# Patient Record
Sex: FEMALE | Race: WHITE | NOT HISPANIC OR LATINO | Employment: OTHER | URBAN - METROPOLITAN AREA
[De-identification: names, ages, dates, MRNs, and addresses within clinical notes are randomized per-mention and may not be internally consistent; named-entity substitution may affect disease eponyms.]

---

## 2017-03-20 ENCOUNTER — TELEPHONE (OUTPATIENT)
Dept: OBSTETRICS AND GYNECOLOGY | Age: 67
End: 2017-03-20

## 2017-05-22 ENCOUNTER — OFFICE VISIT (OUTPATIENT)
Dept: CARDIOLOGY | Facility: CLINIC | Age: 67
End: 2017-05-22

## 2017-05-22 VITALS
WEIGHT: 210.8 LBS | HEIGHT: 66 IN | SYSTOLIC BLOOD PRESSURE: 170 MMHG | DIASTOLIC BLOOD PRESSURE: 98 MMHG | HEART RATE: 80 BPM | BODY MASS INDEX: 33.88 KG/M2

## 2017-05-22 DIAGNOSIS — I10 ESSENTIAL HYPERTENSION: ICD-10-CM

## 2017-05-22 DIAGNOSIS — E66.09 NON MORBID OBESITY DUE TO EXCESS CALORIES: Primary | ICD-10-CM

## 2017-05-22 PROCEDURE — 93000 ELECTROCARDIOGRAM COMPLETE: CPT | Performed by: INTERNAL MEDICINE

## 2017-05-22 PROCEDURE — 99204 OFFICE O/P NEW MOD 45 MIN: CPT | Performed by: INTERNAL MEDICINE

## 2017-05-22 RX ORDER — CHLORTHALIDONE 25 MG/1
25 TABLET ORAL DAILY
Qty: 90 TABLET | Refills: 3 | Status: SHIPPED | OUTPATIENT
Start: 2017-05-22 | End: 2018-08-27

## 2017-06-06 ENCOUNTER — TELEPHONE (OUTPATIENT)
Dept: CARDIOLOGY | Facility: CLINIC | Age: 67
End: 2017-06-06

## 2017-06-06 ENCOUNTER — LAB (OUTPATIENT)
Dept: LAB | Facility: HOSPITAL | Age: 67
End: 2017-06-06

## 2017-06-06 ENCOUNTER — OFFICE VISIT (OUTPATIENT)
Dept: CARDIOLOGY | Facility: CLINIC | Age: 67
End: 2017-06-06

## 2017-06-06 VITALS
BODY MASS INDEX: 34.07 KG/M2 | DIASTOLIC BLOOD PRESSURE: 88 MMHG | SYSTOLIC BLOOD PRESSURE: 132 MMHG | HEIGHT: 66 IN | HEART RATE: 87 BPM | WEIGHT: 212 LBS | OXYGEN SATURATION: 100 %

## 2017-06-06 DIAGNOSIS — I10 ESSENTIAL HYPERTENSION: Primary | ICD-10-CM

## 2017-06-06 DIAGNOSIS — I10 ESSENTIAL HYPERTENSION: ICD-10-CM

## 2017-06-06 LAB
ANION GAP SERPL CALCULATED.3IONS-SCNC: 12.5 MMOL/L
BUN BLD-MCNC: 18 MG/DL (ref 8–23)
BUN/CREAT SERPL: 22.2 (ref 7–25)
CALCIUM SPEC-SCNC: 9.8 MG/DL (ref 8.6–10.5)
CHLORIDE SERPL-SCNC: 101 MMOL/L (ref 98–107)
CO2 SERPL-SCNC: 27.5 MMOL/L (ref 22–29)
CREAT BLD-MCNC: 0.81 MG/DL (ref 0.57–1)
GFR SERPL CREATININE-BSD FRML MDRD: 71 ML/MIN/1.73
GLUCOSE BLD-MCNC: 111 MG/DL (ref 65–99)
POTASSIUM BLD-SCNC: 3.5 MMOL/L (ref 3.5–5.2)
SODIUM BLD-SCNC: 141 MMOL/L (ref 136–145)

## 2017-06-06 PROCEDURE — 99213 OFFICE O/P EST LOW 20 MIN: CPT | Performed by: PHYSICIAN ASSISTANT

## 2017-06-06 PROCEDURE — 93000 ELECTROCARDIOGRAM COMPLETE: CPT | Performed by: PHYSICIAN ASSISTANT

## 2017-06-06 PROCEDURE — 80048 BASIC METABOLIC PNL TOTAL CA: CPT

## 2017-06-06 PROCEDURE — 36415 COLL VENOUS BLD VENIPUNCTURE: CPT

## 2017-06-06 NOTE — PROGRESS NOTES
Date of Office Visit: 2017  Encounter Provider: RAIN Ha  Place of Service: [facility]  Patient Name: Neha Parikh  :1950    Chief Complaint   Patient presents with   • Hypertension     2 week hospital follow up   :     HPI: Neha Parikh is a 67 y.o. female, new to me, who presents today for follow-up.  Old records have been obtained and reviewed by me.  She is a patient of Dr. Mora's with a past medical history significant for hypertension, questionable TIA, and intolerance to many medications.  In the fall of  she was in [facility] with TIA symptoms where she lost the ability to speak for about a minute.  She had a 48 hour Holter monitor which was negative.  Her blood pressure was high that day.  She had an echocardiogram which was unremarkable.  She was last in our office to see Dr. Mora on 2017.  At that visit, she was on carvedilol 6.25 mg daily and losartan 50 mg twice a day.  She felt that the medicines were reducing her sex drive, and Dr. Mora discontinued the Coreg.  He started her on chlorthalidone 25 mg daily.  She is here today for a blood pressure check as well as a BMP.  Dr. Mora felt that if her blood pressure was not controlled well today, we would retry the amlodipine and/or may be hydralazine.  He did not feel that hydralazine would be good for her because it is 3 times a day and she does not like taking medications.   Over the past 2 weeks she's been doing well.  She feels like her energy has come back since she discontinued the carvedilol.  She still has some swelling in her legs, however this is normal for her.  I really cannot appreciate any swelling in her legs today.  She does complain of fatigue and some aches and pains.  She really is pretty inactive, and does not get much exercise at all.  She denies any chest pain, shortness of breath, palpitations, dizziness, or syncope.  She does not  know what her blood pressure has been doing at home because she does not check it.  She states that she use to check her blood pressure all the time, and she would get pretty worked up about it and it would be elevated because of her anxiety.    Past Medical History:   Diagnosis Date   • Anxiety    • Cervical polyp    • Exercise-induced shortness of breath    • GERD (gastroesophageal reflux disease)    • H/O mammogram    • Hypertension    • Nipple retraction    • Palpitations    • Pap smear for cervical cancer screening    • Sinusitis    • Sleep apnea    • Snoring    •  (spontaneous vaginal delivery)    • TIA (transient ischemic attack)     ? MOMENT OF SLURRED SPEECH   • Varicella    • Weight gain        Past Surgical History:   Procedure Laterality Date   • APPENDECTOMY     • DILATATION AND CURETTAGE N/A 2016    Procedure: CERVICAL POLYPECTOMY;  Surgeon: Mackenzie Lassiter MD;  Location: [facility];  Service:        Social History     Social History   • Marital status:      Spouse name: N/A   • Number of children: N/A   • Years of education: N/A     Occupational History   • Not on file.     Social History Main Topics   • Smoking status: Never Smoker   • Smokeless tobacco: Never Used   • Alcohol use No   • Drug use: No   • Sexual activity: Defer     Other Topics Concern   • Not on file     Social History Narrative       Family History   Problem Relation Age of Onset   • Breast cancer Maternal Aunt    • Coronary artery disease Father    • Diabetes Father    • Heart disease Father    • Diabetes Sister    • Stroke Paternal Grandfather    • Alzheimer's disease Mother    • No Known Problems Maternal Grandmother    • No Known Problems Maternal Grandfather    • No Known Problems Paternal Grandmother        Review of Systems   Constitution: Positive for malaise/fatigue. Negative for chills, fever, weight gain and weight loss.   HENT: Negative for ear pain, headaches, hearing loss, nosebleeds and sore  "throat.    Eyes: Negative for double vision, pain and visual disturbance.   Cardiovascular: Positive for leg swelling. Negative for chest pain, dyspnea on exertion, irregular heartbeat, near-syncope, orthopnea, palpitations, paroxysmal nocturnal dyspnea and syncope.   Respiratory: Negative for cough, shortness of breath, sleep disturbances due to breathing, snoring and wheezing.    Endocrine: Negative for cold intolerance, heat intolerance and polyuria.   Skin: Negative for itching and rash.   Musculoskeletal: Negative for joint pain, joint swelling and myalgias.   Gastrointestinal: Negative for abdominal pain, diarrhea, melena, nausea and vomiting.   Genitourinary: Negative for frequency, hematuria and hesitancy.   Neurological: Negative for excessive daytime sleepiness, light-headedness, numbness, paresthesias and seizures.   Psychiatric/Behavioral: Negative for altered mental status and depression.   Allergic/Immunologic: Negative.    All other systems reviewed and are negative.      Allergies   Allergen Reactions   • Penicillins Hives     And rash   • Cefaclor Rash and Hives         Current Outpatient Prescriptions:   •  aspirin 81 MG EC tablet, Take 81 mg by mouth Daily., Disp: , Rfl:   •  chlorthalidone (HYGROTON) 25 MG tablet, Take 1 tablet by mouth Daily., Disp: 90 tablet, Rfl: 3  •  losartan (COZAAR) 50 MG tablet, Take 50 mg by mouth 2 (Two) Times a Day., Disp: , Rfl:      Objective:     Vitals:    06/06/17 0927 06/06/17 0942   BP: 136/90 132/88   BP Location: Right arm Left arm   Pulse: 87    SpO2: 100%    Weight: 212 lb (96.2 kg)    Height: 66\" (167.6 cm)      Body mass index is 34.22 kg/(m^2).    PHYSICAL EXAM:    Physical Exam   Constitutional: She is oriented to person, place, and time. She appears well-developed and well-nourished. No distress.   HENT:   Head: Normocephalic and atraumatic.   Eyes: Pupils are equal, round, and reactive to light.   Neck: No JVD present. No thyromegaly present. "   Cardiovascular: Normal rate, regular rhythm, normal heart sounds and intact distal pulses.    No murmur heard.  Pulmonary/Chest: Effort normal and breath sounds normal. No respiratory distress.   Abdominal: Soft. Bowel sounds are normal. She exhibits no distension. There is no splenomegaly or hepatomegaly. There is no tenderness.   Musculoskeletal: Normal range of motion. She exhibits no edema.   Neurological: She is alert and oriented to person, place, and time.   Skin: Skin is warm and dry. She is not diaphoretic. No erythema.   Psychiatric: She has a normal mood and affect. Her behavior is normal. Judgment normal.         ECG 12 Lead  Date/Time: 6/6/2017 9:48 AM  Performed by: FELY RAMAN.  Authorized by: FELY RAMAN   Comparison: compared with previous ECG from 5/22/2017  Similar to previous ECG  Rhythm: sinus rhythm  BPM: 87  T flattening: V1  Q waves: III and V1  Clinical impression: abnormal ECG  Comments: Indication: Hypertension.              Assessment:       Diagnosis Plan   1. Essential hypertension  Basic Metabolic Panel    ECG 12 Lead     Orders Placed This Encounter   Procedures   • Basic Metabolic Panel     Standing Status:   Future     Standing Expiration Date:   6/6/2018   • ECG 12 Lead     This order was created via procedure documentation          Plan:       Overall she is doing well.  Her blood pressure is much better controlled today at 132/88.  I did discuss with her at length the need to watch her sodium intake.  I've also encouraged her to get some exercise.  Her  walks every day despite the fact that he has a tracheotomy.  Her neighborhood has a lot of access to walking paths, and I really think the daily exercise would be good for her blood pressure as well as her aches and pains.  She indicated that she understood.  I'm going to check a BMP today.  She will come to our outpatient lab either today or tomorrow to get her blood work checked.  I'm going to have her  follow-up with Dr. Mora in 1 year or sooner if needed.    As always, it has been a pleasure to participate in your patient's care.      Sincerely,         Galina Roque PA-C

## 2017-06-16 ENCOUNTER — TELEPHONE (OUTPATIENT)
Dept: CARDIOLOGY | Facility: CLINIC | Age: 67
End: 2017-06-16

## 2017-06-16 RX ORDER — HYDRALAZINE HYDROCHLORIDE 25 MG/1
25 TABLET, FILM COATED ORAL 3 TIMES DAILY
Qty: 90 TABLET | Refills: 11 | Status: SHIPPED | OUTPATIENT
Start: 2017-06-16 | End: 2022-01-10

## 2017-06-16 NOTE — TELEPHONE ENCOUNTER
I talked to the patient.  She is having complaints of muscle and joint aches and pains as well as itching dry skin.  She thinks that it is the losartan.  She says that she is just really super sensitive to medications.  Her blood pressure has been well-controlled, it is in the systolic 130s.  She is very frustrated, and she has seen her primary care physician and they don't really have much to offer her.  I'm going to discontinue the losartan and start her on hydralazine 25 mg 3 times a day.  She will call me next week to check an.

## 2017-06-16 NOTE — TELEPHONE ENCOUNTER
I talked to the patient and she is having joint and mucsle pain along with itching. This seams to have started since she started taking losartan. She would like to know if you or Dr Mora may know of some medications that do not have as many side effects. She can be reached at [phone]      Thanks   Edd

## 2017-11-07 ENCOUNTER — PROCEDURE VISIT (OUTPATIENT)
Dept: OBSTETRICS AND GYNECOLOGY | Age: 67
End: 2017-11-07

## 2017-11-07 ENCOUNTER — OFFICE VISIT (OUTPATIENT)
Dept: OBSTETRICS AND GYNECOLOGY | Age: 67
End: 2017-11-07

## 2017-11-07 VITALS
WEIGHT: 217 LBS | DIASTOLIC BLOOD PRESSURE: 98 MMHG | SYSTOLIC BLOOD PRESSURE: 160 MMHG | BODY MASS INDEX: 36.15 KG/M2 | HEIGHT: 65 IN

## 2017-11-07 DIAGNOSIS — L90.0 LICHEN SCLEROSUS: ICD-10-CM

## 2017-11-07 DIAGNOSIS — Z13.820 SCREENING FOR OSTEOPOROSIS: ICD-10-CM

## 2017-11-07 DIAGNOSIS — Z00.00 ENCOUNTER FOR MEDICARE ANNUAL WELLNESS EXAM: Primary | ICD-10-CM

## 2017-11-07 DIAGNOSIS — Z12.4 SCREENING FOR CERVICAL CANCER: ICD-10-CM

## 2017-11-07 DIAGNOSIS — Z78.0 POST-MENOPAUSAL: Primary | ICD-10-CM

## 2017-11-07 DIAGNOSIS — R14.0 BLOATING: ICD-10-CM

## 2017-11-07 PROCEDURE — 77080 DXA BONE DENSITY AXIAL: CPT | Performed by: OBSTETRICS & GYNECOLOGY

## 2017-11-07 PROCEDURE — G0101 CA SCREEN;PELVIC/BREAST EXAM: HCPCS | Performed by: OBSTETRICS & GYNECOLOGY

## 2017-11-07 NOTE — PROGRESS NOTES
Routine Annual Visit    2017    Patient: Neha Parikh          MR#:[id number]      Chief Complaint   Patient presents with   • Annual Exam     PT HERE FOR ROUTINE AE AND MG. SHE IS WELL, PCP IS TRYING A NEW BP MED ON HER. NO PAP ON FILE IN EPIC OR [facility] Week[facility].       History of Present Illness    67 y.o. female  who presents for annual exam.   No more bleeding since polyp removed last year  Having intermittent vulvar itching and dryness  Pap UTD  mammo today  CSC 4 years ago  Stephanie La mom  Bloating and weight gain, also knee pain, concerned about ovarian cancer  Discussed dexa scan- hasnt had one in quite sometime and opposed to meds  Discussed rationale for checking and taking fosomax if has osteoporosis          No LMP recorded. Patient is postmenopausal.  Obstetric History:  OB History      Para Term  AB Living    2 2 2 0 0 2    SAB TAB Ectopic Multiple Live Births    0 0 0 0          Menstrual History:     No LMP recorded. Patient is postmenopausal.       Sexual History:       ________________________________________  Patient Active Problem List   Diagnosis   • Cervical polyp   • Non morbid obesity due to excess calories   • Hypertension       Past Medical History:   Diagnosis Date   • Anxiety    • Cervical polyp    • Exercise-induced shortness of breath    • GERD (gastroesophageal reflux disease)    • H/O mammogram    • Hypertension    • Nipple retraction    • Palpitations    • Pap smear for cervical cancer screening    • Sinusitis    • Sleep apnea    • Snoring    •  (spontaneous vaginal delivery)    • TIA (transient ischemic attack)     ? MOMENT OF SLURRED SPEECH   • Varicella    • Weight gain        Past Surgical History:   Procedure Laterality Date   • APPENDECTOMY     • DILATATION AND CURETTAGE N/A 2016    Procedure: CERVICAL POLYPECTOMY;  Surgeon: Mackenzie Lassiter MD;  Location: [facility] OR;  Service:        History   Smoking Status   • Never Smoker   Smokeless  "Tobacco   • Never Used       has a current medication list which includes the following prescription(s): hydralazine, aspirin, betamethasone valerate, and chlorthalidone.  ________________________________________    Current contraception: post menopausal status  History of abnormal Pap smear: no  Family history of Breast cancer: no  Family history of uterine or ovarian cancer: no  Family History of colon cancer/colon polyps: no  History of abnormal mammogram: no      The following portions of the patient's history were reviewed and updated as appropriate: allergies, current medications, past family history, past medical history, past social history, past surgical history and problem list.    Review of Systems    Pertinent items are noted in HPI.     Objective   Physical Exam    /98  Ht 65\" (165.1 cm)  Wt 217 lb (98.4 kg)  BMI 36.11 kg/m2   BP Readings from Last 3 Encounters:   11/07/17 160/98   06/06/17 132/88   05/22/17 170/98      Wt Readings from Last 3 Encounters:   11/07/17 217 lb (98.4 kg)   06/06/17 212 lb (96.2 kg)   05/22/17 210 lb 12.8 oz (95.6 kg)      BMI: Estimated body mass index is 36.11 kg/(m^2) as calculated from the following:    Height as of this encounter: 65\" (165.1 cm).    Weight as of this encounter: 217 lb (98.4 kg).      General:   alert, appears stated age and cooperative   Abdomen: soft, non-tender, without masses or organomegaly   Breast: inspection negative, no nipple discharge or bleeding, no masses or nodularity palpable   Vulva: evidence of lichen sclerosus especially on right labia, no suspicious lesions   Vagina: normal mucosa   Cervix: no cervical motion tenderness and no lesions   Uterus: normal size, mobile or non-tender   Adnexa: no mass, fullness, tenderness     Assessment:    1. Normal annual exam   Assessment     ICD-10-CM ICD-9-CM   1. Encounter for Medicare annual wellness exam Z00.00 V70.0   2. Bloating R14.0 787.3   3. Lichen sclerosus L90.0 701.0   4. Screening " for osteoporosis Z13.820 V82.81   5. Screening for cervical cancer Z12.4 V76.2     Plan:    Plan     [x]  Mammogram request made  []  PAP done  []  Labs:   []  GC/Chl/TV  [x]  DEXA scan   []  Referral for colonoscopy:       Neha was seen today for annual exam.    Diagnoses and all orders for this visit:    Encounter for Medicare annual wellness exam    Bloating    Lichen sclerosus    Screening for osteoporosis    Screening for cervical cancer  -     IGP, Apt HPV,rfx 16 / 18,45 - ThinPrep Vial, Cervix    Other orders  -     betamethasone valerate (VALISONE) 0.1 % ointment; Apply  topically 2 (Two) Times a Day. Apply BID for 1 week for flare up , apply 2-3 times weekly for maintenance  -      DEXA SCAN      Check GYN US seocndary to new onset bloating symptoms  dexa today- discussed fosomax if necessary  Discussed lichen sclerosus and treatment sent in  Pap done - no abnormal, may be last screening pap if normal  m[place]o done  [place] [facility] [place]A done after exam- reviewed and will call her with results, hip- osteoporosis and severe osteopenia of spine area, will send fosomax to pharmacy    Counseling:  --Nutrition: Stressed importance of moderation and caloric balance, stressed fresh fruit and vegetables  --Exercise: Stressed the importance of regular exercise. 3-5 times weekly   - Discussed screening mammogram recommendations.   --Discussed benefits of screening colonoscopy- age 50 unless FH  --Discussed pap smear screening recommendations

## 2017-11-10 LAB
CYTOLOGIST CVX/VAG CYTO: NORMAL
CYTOLOGY CVX/VAG DOC THIN PREP: NORMAL
DX ICD CODE: NORMAL
HIV 1 & 2 AB SER-IMP: NORMAL
HPV I/H RISK 4 DNA CVX QL PROBE+SIG AMP: NEGATIVE
Lab: NORMAL
OTHER STN SPEC: NORMAL
PATH REPORT.FINAL DX SPEC: NORMAL
STAT OF ADQ CVX/VAG CYTO-IMP: NORMAL

## 2017-12-14 ENCOUNTER — PROCEDURE VISIT (OUTPATIENT)
Dept: OBSTETRICS AND GYNECOLOGY | Age: 67
End: 2017-12-14

## 2017-12-14 ENCOUNTER — OFFICE VISIT (OUTPATIENT)
Dept: OBSTETRICS AND GYNECOLOGY | Age: 67
End: 2017-12-14

## 2017-12-14 VITALS
DIASTOLIC BLOOD PRESSURE: 88 MMHG | HEIGHT: 55 IN | WEIGHT: 215 LBS | SYSTOLIC BLOOD PRESSURE: 128 MMHG | BODY MASS INDEX: 49.76 KG/M2

## 2017-12-14 DIAGNOSIS — M81.0 AGE-RELATED OSTEOPOROSIS WITHOUT CURRENT PATHOLOGICAL FRACTURE: ICD-10-CM

## 2017-12-14 DIAGNOSIS — R19.09 OTHER INTRA-ABDOMINAL AND PELVIC SWELLING, MASS AND LUMP: ICD-10-CM

## 2017-12-14 DIAGNOSIS — R14.0 BLOATING: Primary | ICD-10-CM

## 2017-12-14 PROCEDURE — 76830 TRANSVAGINAL US NON-OB: CPT | Performed by: OBSTETRICS & GYNECOLOGY

## 2017-12-14 PROCEDURE — 99213 OFFICE O/P EST LOW 20 MIN: CPT | Performed by: OBSTETRICS & GYNECOLOGY

## 2017-12-14 NOTE — PROGRESS NOTES
GYN Visit    2017    Patient: Neha Parikh          MR#:[id number]      Chief Complaint   Patient presents with   • Follow-up     PT HERE FOR GYN U/S DUE TO BLOATING. SHE IS OTHERWISE GOOD.       History of Present Illness    67 y.o. 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 Patient is postmenopausal.    ________________________________________  Patient Active Problem List   Diagnosis   • Cervical polyp   • Non morbid obesity due to excess calories   • Hypertension       Past Medical History:   Diagnosis Date   • Anxiety    • Cervical polyp    • Exercise-induced shortness of breath    • GERD (gastroesophageal reflux disease)    • H/O mammogram    • Hypertension    • Nipple retraction    • Palpitations    • Pap smear for cervical cancer screening    • Sinusitis    • Sleep apnea    • Snoring    •  (spontaneous vaginal delivery)    • TIA (transient ischemic attack)     ? MOMENT OF SLURRED SPEECH   • Varicella    • Weight gain        Past Surgical History:   Procedure Laterality Date   • APPENDECTOMY     • DILATATION AND CURETTAGE N/A 2016    Procedure: CERVICAL POLYPECTOMY;  Surgeon: Mackenzie Lassiter MD;  Location: [facility];  Service:        History   Smoking Status   • Never Smoker   Smokeless Tobacco   • Never Used       has a current medication list which includes the following prescription(s): hydralazine, aspirin, betamethasone valerate, and chlorthalidone.  ________________________________________    Current contraception: post menopausal  "status      The following portions of the patient's history were reviewed and updated as appropriate: allergies, current medications, past family history, past medical history, past social history, past surgical history and problem list.    Review of Systems   Constitutional: Negative for chills, fatigue and fever.   Gastrointestinal: Positive for abdominal distention and constipation. Negative for diarrhea, nausea and vomiting.   Genitourinary: Negative for pelvic pain and vaginal discharge.   Neurological: Negative for light-headedness.   Psychiatric/Behavioral: Negative for dysphoric mood.   All other systems reviewed and are negative.           Objective   Physical Exam    /88  Ht 65 cm (25.59\")  Wt 97.5 kg (215 lb)  .82 kg/m2   BP Readings from Last 3 Encounters:   12/14/17 128/88   11/07/17 160/98   06/06/17 132/88      Wt Readings from Last 3 Encounters:   12/14/17 97.5 kg (215 lb)   11/07/17 98.4 kg (217 lb)   06/06/17 96.2 kg (212 lb)      BMI: Estimated body mass index is 230.82 kg/(m^2) as calculated from the following:    Height as of this encounter: 65 cm (25.59\").    Weight as of this encounter: 97.5 kg (215 lb).    Neck: no adenopathy, supple, symmetrical, trachea midline and thyroid not enlarged, symmetric, no tenderness/mass/nodules  Lungs: non labored breathing  Extremities: extremities normal, atraumatic, no cyanosis or edema  Skin: Skin color, texture, turgor normal. No rashes or lesions  Neurologic: Grossly normal  General:   alert, appears stated age and cooperative   Abdomen: soft, non-tender, without masses or organomegaly   Breast: Not examined   Vulva: normal   Vagina: normal mucosa   Cervix: no cervical motion tenderness and no lesions   Uterus: normal size, mobile or non-tender   Adnexa: no mass, fullness, tenderness     Assessment:      Neha was seen today for follow-up.    Diagnoses and all orders for this visit:    Bloating  -         Age-related osteoporosis " "without current pathological fracture    Other intra-abdominal and pelvic swelling, mass and lump   -           See US report  Normal pelvic US- no mass or free fluid  rec check     Strongly encouraged treatment for osteoporosis  Pt doing wt bearing exercise and getting dietary calcium and vit D supplement  Pt reluctant to take any meds and wants to \"let the cards fall how they will\"  I gave her a pamphlet re fosomax to look at - she will call back if she changes her mind        "

## 2017-12-15 LAB — CANCER AG125 SERPL-ACNC: 11.1 U/ML (ref 0–38.1)

## 2018-05-16 VITALS
HEART RATE: 88 BPM | SYSTOLIC BLOOD PRESSURE: 132 MMHG | HEIGHT: 66 IN | DIASTOLIC BLOOD PRESSURE: 84 MMHG | RESPIRATION RATE: 16 BRPM | OXYGEN SATURATION: 100 % | OXYGEN SATURATION: 92 % | DIASTOLIC BLOOD PRESSURE: 87 MMHG | RESPIRATION RATE: 17 BRPM | DIASTOLIC BLOOD PRESSURE: 83 MMHG | SYSTOLIC BLOOD PRESSURE: 176 MMHG | HEART RATE: 77 BPM | RESPIRATION RATE: 6 BRPM | HEART RATE: 86 BPM | SYSTOLIC BLOOD PRESSURE: 122 MMHG | HEART RATE: 94 BPM | HEART RATE: 96 BPM | DIASTOLIC BLOOD PRESSURE: 92 MMHG | RESPIRATION RATE: 29 BRPM | OXYGEN SATURATION: 99 % | SYSTOLIC BLOOD PRESSURE: 173 MMHG | WEIGHT: 205 LBS | HEART RATE: 76 BPM | TEMPERATURE: 98.8 F | OXYGEN SATURATION: 86 % | TEMPERATURE: 96.5 F | SYSTOLIC BLOOD PRESSURE: 160 MMHG | SYSTOLIC BLOOD PRESSURE: 170 MMHG | DIASTOLIC BLOOD PRESSURE: 98 MMHG | RESPIRATION RATE: 23 BRPM

## 2018-05-17 ENCOUNTER — AMBULATORY SURGICAL CENTER (AMBULATORY)
Dept: URBAN - METROPOLITAN AREA SURGERY 17 | Facility: SURGERY | Age: 68
End: 2018-05-17

## 2018-05-17 ENCOUNTER — OFFICE (AMBULATORY)
Dept: URBAN - METROPOLITAN AREA PATHOLOGY 4 | Facility: PATHOLOGY | Age: 68
End: 2018-05-17

## 2018-05-17 DIAGNOSIS — Z86.010 PERSONAL HISTORY OF COLONIC POLYPS: ICD-10-CM

## 2018-05-17 DIAGNOSIS — D12.5 BENIGN NEOPLASM OF SIGMOID COLON: ICD-10-CM

## 2018-05-17 DIAGNOSIS — D12.3 BENIGN NEOPLASM OF TRANSVERSE COLON: ICD-10-CM

## 2018-05-17 DIAGNOSIS — D12.2 BENIGN NEOPLASM OF ASCENDING COLON: ICD-10-CM

## 2018-05-17 DIAGNOSIS — K21.0 GASTRO-ESOPHAGEAL REFLUX DISEASE WITH ESOPHAGITIS: ICD-10-CM

## 2018-05-17 DIAGNOSIS — K44.9 DIAPHRAGMATIC HERNIA WITHOUT OBSTRUCTION OR GANGRENE: ICD-10-CM

## 2018-05-17 DIAGNOSIS — K21.9 GASTRO-ESOPHAGEAL REFLUX DISEASE WITHOUT ESOPHAGITIS: ICD-10-CM

## 2018-05-17 LAB
GI HISTOLOGY: A. UNSPECIFIED: (no result)
GI HISTOLOGY: B. UNSPECIFIED: (no result)
GI HISTOLOGY: C. UNSPECIFIED: (no result)
GI HISTOLOGY: D. UNSPECIFIED: (no result)
GI HISTOLOGY: E. UNSPECIFIED: (no result)
GI HISTOLOGY: PDF REPORT: (no result)

## 2018-05-17 PROCEDURE — 88305 TISSUE EXAM BY PATHOLOGIST: CPT | Mod: PT | Performed by: INTERNAL MEDICINE

## 2018-05-17 PROCEDURE — 45385 COLONOSCOPY W/LESION REMOVAL: CPT | Mod: PT | Performed by: INTERNAL MEDICINE

## 2018-05-17 PROCEDURE — 43239 EGD BIOPSY SINGLE/MULTIPLE: CPT | Performed by: INTERNAL MEDICINE

## 2018-05-17 RX ADMIN — PROPOFOL 50 MG: 10 INJECTION, EMULSION INTRAVENOUS at 10:06

## 2018-05-17 RX ADMIN — PROPOFOL 50 MG: 10 INJECTION, EMULSION INTRAVENOUS at 10:25

## 2018-05-17 RX ADMIN — PROPOFOL 50 MG: 10 INJECTION, EMULSION INTRAVENOUS at 10:00

## 2018-05-17 RX ADMIN — PROPOFOL 100 MG: 10 INJECTION, EMULSION INTRAVENOUS at 09:46

## 2018-05-17 RX ADMIN — PROPOFOL 50 MG: 10 INJECTION, EMULSION INTRAVENOUS at 10:10

## 2018-05-17 RX ADMIN — PROPOFOL 100 MG: 10 INJECTION, EMULSION INTRAVENOUS at 09:47

## 2018-05-17 RX ADMIN — PROPOFOL 50 MG: 10 INJECTION, EMULSION INTRAVENOUS at 10:14

## 2018-05-17 RX ADMIN — PROPOFOL 50 MG: 10 INJECTION, EMULSION INTRAVENOUS at 09:50

## 2018-05-17 RX ADMIN — PROPOFOL 50 MG: 10 INJECTION, EMULSION INTRAVENOUS at 09:55

## 2018-08-27 ENCOUNTER — OFFICE VISIT (OUTPATIENT)
Dept: CARDIOLOGY | Facility: CLINIC | Age: 68
End: 2018-08-27

## 2018-08-27 VITALS
HEART RATE: 91 BPM | SYSTOLIC BLOOD PRESSURE: 170 MMHG | DIASTOLIC BLOOD PRESSURE: 100 MMHG | WEIGHT: 217 LBS | BODY MASS INDEX: 34.87 KG/M2 | HEIGHT: 66 IN

## 2018-08-27 DIAGNOSIS — I10 ESSENTIAL HYPERTENSION: Primary | ICD-10-CM

## 2018-08-27 PROCEDURE — 93000 ELECTROCARDIOGRAM COMPLETE: CPT | Performed by: INTERNAL MEDICINE

## 2018-08-27 PROCEDURE — 99214 OFFICE O/P EST MOD 30 MIN: CPT | Performed by: INTERNAL MEDICINE

## 2018-08-27 NOTE — PROGRESS NOTES
Date of Office Visit: 2018  Encounter Provider: Hang Mora MD  Place of Service: [facility] CARDIOLOGY  Patient Name: Neha Parikh  :1950  [id number]    Chief Complaint   Patient presents with   • Hypertension   :     HPI: Neha Parikh is a 68 y.o. female   This is a pretty tough problem because of an intolerance to a number of our medicines.  She, I think, could be significantly hypertensive, but what I have asked her to do is check her blood pressures at home once a day over the next two weeks and send them in to us so we can see what they are and if they are high at home then I would increase her hydralazine.  I am going to have her come and see RAIN Hess in three months and see me in six months.          Past Medical History:   Diagnosis Date   • Anxiety    • Cervical polyp    • Exercise-induced shortness of breath    • GERD (gastroesophageal reflux disease)    • H/O mammogram    • Hypertension    • Nipple retraction    • Palpitations    • Pap smear for cervical cancer screening    • Sinusitis    • Sleep apnea    • Snoring    •  (spontaneous vaginal delivery)    • TIA (transient ischemic attack)     ? MOMENT OF SLURRED SPEECH   • Varicella    • Weight gain        Past Surgical History:   Procedure Laterality Date   • APPENDECTOMY     • DILATATION AND CURETTAGE N/A 2016    Procedure: CERVICAL POLYPECTOMY;  Surgeon: Mackenzie Lassiter MD;  Location: [facility];  Service:        Social History     Social History   • Marital status:      Spouse name: N/A   • Number of children: N/A   • Years of education: N/A     Occupational History   • Not on file.     Social History Main Topics   • Smoking status: Never Smoker   • Smokeless tobacco: Never Used   • Alcohol use No   • Drug use: No   • Sexual activity: Defer     Other Topics Concern   • Not on file     Social History Narrative   • No narrative on file       Family History   Problem  Relation Age of Onset   • Breast cancer Maternal Aunt    • Coronary artery disease Father    • Diabetes Father    • Heart disease Father    • Diabetes Sister    • Stroke Paternal Grandfather    • Alzheimer's disease Mother    • No Known Problems Maternal Grandmother    • No Known Problems Maternal Grandfather    • No Known Problems Paternal Grandmother    • No Known Problems Brother    • No Known Problems Daughter    • No Known Problems Son    • No Known Problems Paternal Aunt    • BRCA 1/2 Neg Hx    • Colon cancer Neg Hx    • Endometrial cancer Neg Hx    • Ovarian cancer Neg Hx        Review of Systems   Constitution: Negative for decreased appetite, fever, malaise/fatigue and weight loss.   HENT: Negative for nosebleeds.    Eyes: Negative for double vision.   Cardiovascular: Negative for chest pain, claudication, cyanosis, dyspnea on exertion, irregular heartbeat, leg swelling, near-syncope, orthopnea, palpitations, paroxysmal nocturnal dyspnea and syncope.   Respiratory: Negative for cough, hemoptysis and shortness of breath.    Hematologic/Lymphatic: Negative for bleeding problem.   Skin: Negative for rash.   Musculoskeletal: Negative for falls and myalgias.   Gastrointestinal: Negative for hematochezia, jaundice, melena, nausea and vomiting.   Genitourinary: Negative for hematuria.   Neurological: Negative for dizziness and seizures.   Psychiatric/Behavioral: Negative for altered mental status and memory loss.       Allergies   Allergen Reactions   • Chlorthalidone Unknown (See Comments)     cramps   • Losartan Unknown (See Comments)     unknoiwn   • Penicillins Hives     And rash   • Cefaclor Rash and Hives         Current Outpatient Prescriptions:   •  aspirin 81 MG EC tablet, Take 81 mg by mouth Daily., Disp: , Rfl:   •  hydrALAZINE (APRESOLINE) 25 MG tablet, Take 1 tablet by mouth 3 (Three) Times a Day., Disp: 90 tablet, Rfl: 11     Objective:     Vitals:    08/27/18 1536   BP: 170/100   Pulse: 91   Weight:  "98.4 kg (217 lb)   Height: 167.6 cm (66\")     Body mass index is 35.02 kg/m².    Physical Exam   Constitutional: She is oriented to person, place, and time. She appears well-developed and well-nourished.   HENT:   Head: Normocephalic.   Eyes: No scleral icterus.   Neck: No JVD present. No thyromegaly present.   Cardiovascular: Normal rate, regular rhythm and normal heart sounds.  Exam reveals no gallop and no friction rub.    No murmur heard.  Pulmonary/Chest: Effort normal and breath sounds normal. She has no wheezes. She has no rales.   Abdominal: Soft. There is no hepatosplenomegaly. There is no tenderness.   Musculoskeletal: Normal range of motion. She exhibits no edema.   Lymphadenopathy:     She has no cervical adenopathy.   Neurological: She is alert and oriented to person, place, and time.   Skin: Skin is warm and dry. No rash noted.   Psychiatric: She has a normal mood and affect.         ECG 12 Lead  Date/Time: 8/27/2018 4:22 PM  Performed by: SUHA MORA  Authorized by: SUHA MORA   Comparison: compared with previous ECG   Similar to previous ECG  Rhythm: sinus rhythm  Clinical impression: normal ECG             Assessment:       Diagnosis Plan   1. Essential hypertension            Plan:       This is a pretty tough problem because of an intolerance to a number of our medicines.  She, I think, could be significantly hypertensive, but what I have asked her to do is check her blood pressures at home once a day over the next two weeks and send them in to us so we can see what they are and if they are high at home then I would increase her hydralazine.  I am going to have her come and see RAIN Hess in three months and see me in six months.              As always, it has been a pleasure to participate in your patient's care.      Sincerely,       Suha Mora MD                  "

## 2018-09-06 ENCOUNTER — TELEPHONE (OUTPATIENT)
Dept: CARDIOLOGY | Facility: CLINIC | Age: 68
End: 2018-09-06

## 2018-09-06 NOTE — TELEPHONE ENCOUNTER
Patient in on 8/27/18 you had ask her to send in her BP readings.     8/28//77  8/30//77  8/31/18- 142/89  9/1/18- 149/88  9/2/18- 141/87  9/3/18- 127/77  9/4/18- 131/82    Office note from Tiffanie Bolden  On 8/14/18 --130/80    This information is in your inbox also

## 2018-10-04 ENCOUNTER — TELEPHONE (OUTPATIENT)
Dept: ORTHOPEDIC SURGERY | Facility: CLINIC | Age: 68
End: 2018-10-04

## 2018-10-04 ENCOUNTER — OFFICE VISIT (OUTPATIENT)
Dept: ORTHOPEDIC SURGERY | Facility: CLINIC | Age: 68
End: 2018-10-04

## 2018-10-04 VITALS — TEMPERATURE: 98.9 F | WEIGHT: 214 LBS | HEIGHT: 66 IN | BODY MASS INDEX: 34.39 KG/M2

## 2018-10-04 DIAGNOSIS — M25.562 CHRONIC PAIN OF BOTH KNEES: Primary | ICD-10-CM

## 2018-10-04 DIAGNOSIS — M25.561 CHRONIC PAIN OF BOTH KNEES: Primary | ICD-10-CM

## 2018-10-04 DIAGNOSIS — M17.10 PRIMARY LOCALIZED OSTEOARTHROSIS OF LOWER LEG, UNSPECIFIED LATERALITY: ICD-10-CM

## 2018-10-04 DIAGNOSIS — G89.29 CHRONIC PAIN OF BOTH KNEES: Primary | ICD-10-CM

## 2018-10-04 PROCEDURE — 73562 X-RAY EXAM OF KNEE 3: CPT | Performed by: ORTHOPAEDIC SURGERY

## 2018-10-04 PROCEDURE — 20610 DRAIN/INJ JOINT/BURSA W/O US: CPT | Performed by: ORTHOPAEDIC SURGERY

## 2018-10-04 PROCEDURE — 99204 OFFICE O/P NEW MOD 45 MIN: CPT | Performed by: ORTHOPAEDIC SURGERY

## 2018-10-04 RX ORDER — LIDOCAINE HYDROCHLORIDE 20 MG/ML
4 INJECTION, SOLUTION EPIDURAL; INFILTRATION; INTRACAUDAL; PERINEURAL
Status: COMPLETED | OUTPATIENT
Start: 2018-10-04 | End: 2018-10-04

## 2018-10-04 RX ORDER — MELOXICAM 15 MG/1
TABLET ORAL
Qty: 30 TABLET | Refills: 3 | Status: SHIPPED | OUTPATIENT
Start: 2018-10-04 | End: 2018-11-27

## 2018-10-04 RX ORDER — METHYLPREDNISOLONE ACETATE 80 MG/ML
80 INJECTION, SUSPENSION INTRA-ARTICULAR; INTRALESIONAL; INTRAMUSCULAR; SOFT TISSUE
Status: COMPLETED | OUTPATIENT
Start: 2018-10-04 | End: 2018-10-04

## 2018-10-04 RX ADMIN — LIDOCAINE HYDROCHLORIDE 4 ML: 20 INJECTION, SOLUTION EPIDURAL; INFILTRATION; INTRACAUDAL; PERINEURAL at 08:42

## 2018-10-04 RX ADMIN — LIDOCAINE HYDROCHLORIDE 4 ML: 20 INJECTION, SOLUTION EPIDURAL; INFILTRATION; INTRACAUDAL; PERINEURAL at 08:41

## 2018-10-04 RX ADMIN — METHYLPREDNISOLONE ACETATE 80 MG: 80 INJECTION, SUSPENSION INTRA-ARTICULAR; INTRALESIONAL; INTRAMUSCULAR; SOFT TISSUE at 08:42

## 2018-10-04 RX ADMIN — METHYLPREDNISOLONE ACETATE 80 MG: 80 INJECTION, SUSPENSION INTRA-ARTICULAR; INTRALESIONAL; INTRAMUSCULAR; SOFT TISSUE at 08:41

## 2018-10-04 NOTE — PROGRESS NOTES
New bilateral Knee      Patient: Neha Parikh        YOB: 1950    Medical Record Number: [id number]        Chief Complaints: bilateral knee pain  Chief Complaint   Patient presents with   • Left Knee - Pain, Establish Care   • Right Knee - Pain, Establish Care           History of Present Illness: This is a 68-year-old female who presents complaining of bilateral knee pain left greater than right.  She is the daughter of another patient of mine Mr. Frantz morris.  I have been fortunate to see there and her family.  Knees have been bothering her for several months worse going upstairs no night pain no swelling she does have a Baker cyst on both most of her pain is with weightbearing.  Symptoms are mild intermittent aching swelling worse with walking better with rest she is a  past medical history is remarkable for GERD hypertension palpitations sinusitis sleep apnea        Allergies:   Allergies   Allergen Reactions   • Azithromycin Hives   • Chlorthalidone Unknown (See Comments)     cramps   • Coreg [Carvedilol] Unknown (See Comments)     Fatigue decreased libido   • Losartan Unknown (See Comments)     unknoiwn   • Penicillins Hives     And rash   • Cefaclor Rash and Hives       Medications:   Home Medications:  Current Outpatient Prescriptions on File Prior to Visit   Medication Sig   • aspirin 81 MG EC tablet Take 81 mg by mouth Daily.   • hydrALAZINE (APRESOLINE) 25 MG tablet Take 1 tablet by mouth 3 (Three) Times a Day.     No current facility-administered medications on file prior to visit.      Current Medications:  Scheduled Meds:  Continuous Infusions:  No current facility-administered medications for this visit.   PRN Meds:.    Past Medical History:   Diagnosis Date   • Anxiety    • Cervical polyp    • Exercise-induced shortness of breath    • GERD (gastroesophageal reflux disease)    • H/O mammogram    • Hypertension    • Nipple retraction    • Palpitations    • Pap smear for  "cervical cancer screening    • Sinusitis    • Sleep apnea    • Snoring    •  (spontaneous vaginal delivery)    • TIA (transient ischemic attack)     ? MOMENT OF SLURRED SPEECH   • Varicella    • Weight gain         Past Surgical History:   Procedure Laterality Date   • APPENDECTOMY     • DILATATION AND CURETTAGE N/A 2016    Procedure: CERVICAL POLYPECTOMY;  Surgeon: Mackenzie Lassiter MD;  Location: [facility] OR;  Service:         Social History     Occupational History   • Not on file.     Social History Main Topics   • Smoking status: Never Smoker   • Smokeless tobacco: Never Used   • Alcohol use No   • Drug use: No   • Sexual activity: Defer    Social History     Social History Narrative   • No narrative on file        Family History   Problem Relation Age of Onset   • Breast cancer Maternal Aunt    • Coronary artery disease Father    • Diabetes Father    • Heart disease Father    • Diabetes Sister    • Stroke Paternal Grandfather    • Alzheimer's disease Mother    • No Known Problems Maternal Grandmother    • No Known Problems Maternal Grandfather    • No Known Problems Paternal Grandmother    • No Known Problems Brother    • No Known Problems Daughter    • No Known Problems Son    • No Known Problems Paternal Aunt    • BRCA 1/2 Neg Hx    • Colon cancer Neg Hx    • Endometrial cancer Neg Hx    • Ovarian cancer Neg Hx              Review of Systems: 14 point review of systems are remarkable for pertinent positives listed in the chart by the patient the remainder are negative    Review of Systems      Physical Exam: 68 y.o. female  General Appearance:    Alert, cooperative, in no acute distress                 Vitals:    10/04/18 0829   Temp: 98.9 °F (37.2 °C)   Weight: 97.1 kg (214 lb)   Height: 167.6 cm (66\")      Patient is alert and read ×3 no acute distress appears her above-listed at height weight and age.  Affect is normal respiratory rate is normal unlabored. Heart rate regular rate rhythm, " sclera, dentition and hearing are normal for the purpose of this exam.        Ortho Exam Physical exam of the left knee reveals no effusion, no erythema.  It mild loss of extension and full flexion  Patient has mild varus alignment.  They have mild tenderness to palpation about the medial compartment, no tenderness laterally..  The patient has a negative bounce home, negative Toño and a stable ligamentous exam.  Quad tone is reasonable and symmetric.  There are no overlying skin changes no lymphedema no lymphadenopathy.  There is good hip range of motion which is full symmetric and asymptomatic and a normal ankle exam.    Physical exam of the right knee reveals no effusion, no erythema.  It mild loss of extension and full flexion  Patient has mild varus alignment.  They have mild tenderness to palpation about the medial compartment, no tenderness laterally..  The patient has a negative bounce home, negative Toño and a stable ligamentous exam.  Quad tone is reasonable and symmetric.  There are no overlying skin changes no lymphedema no lymphadenopathy.  There is good hip range of motion which is full symmetric and asymptomatic and a normal ankle exam.      Large Joint Arthrocentesis  Date/Time: 10/4/2018 8:41 AM  Consent given by: patient  Site marked: site marked  Timeout: Immediately prior to procedure a time out was called to verify the correct patient, procedure, equipment, support staff and site/side marked as required   Supporting Documentation  Indications: pain   Procedure Details  Location: knee - R knee  Preparation: Patient was prepped and draped in the usual sterile fashion  Needle size: 22 G  Approach: anteromedial  Medications administered: 80 mg methylPREDNISolone acetate 80 MG/ML; 4 mL lidocaine PF 2% 2 %  Patient tolerance: patient tolerated the procedure well with no immediate complications    Large Joint Arthrocentesis  Date/Time: 10/4/2018 8:42 AM  Consent given by: patient  Site marked:  site marked  Timeout: Immediately prior to procedure a time out was called to verify the correct patient, procedure, equipment, support staff and site/side marked as required   Supporting Documentation  Indications: pain   Procedure Details  Location: knee - L knee  Preparation: Patient was prepped and draped in the usual sterile fashion  Needle size: 22 G  Approach: anteromedial  Medications administered: 80 mg methylPREDNISolone acetate 80 MG/ML; 4 mL lidocaine PF 2% 2 %  Patient tolerance: patient tolerated the procedure well with no immediate complications                   Radiology:   AP, Lateral and merchant views of the right and left knee  were ordered/reviewed to evauateknee pain.  I've no comparative films she has marked narrowing of her medial compartment with varus alignment also moderate patellofemoral OA  Imaging Results (most recent)     Procedure Component Value Units Date/Time    XR Knee 3 View Bilateral [[id number]] Resulted:  10/04/18 0825     Updated:  10/04/18 0825    Impression:       Ordering physician's impression is located in the Encounter Note dated 10/04/18. X-ray performed in the DR room.          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  I will also have her see Dr. Wood for discussion of knee arthroplasty

## 2018-10-15 ENCOUNTER — TREATMENT (OUTPATIENT)
Dept: PHYSICAL THERAPY | Facility: CLINIC | Age: 68
End: 2018-10-15

## 2018-10-15 DIAGNOSIS — M17.10 PRIMARY LOCALIZED OSTEOARTHROSIS OF LOWER LEG, UNSPECIFIED LATERALITY: Primary | ICD-10-CM

## 2018-10-15 PROCEDURE — G8979 MOBILITY GOAL STATUS: HCPCS | Performed by: PHYSICAL THERAPIST

## 2018-10-15 PROCEDURE — G8978 MOBILITY CURRENT STATUS: HCPCS | Performed by: PHYSICAL THERAPIST

## 2018-10-15 PROCEDURE — 97161 PT EVAL LOW COMPLEX 20 MIN: CPT | Performed by: PHYSICAL THERAPIST

## 2018-10-15 PROCEDURE — 97110 THERAPEUTIC EXERCISES: CPT | Performed by: PHYSICAL THERAPIST

## 2018-10-15 NOTE — PROGRESS NOTES
I have reviewed the notes, assessments, and/or procedures performed by Yovani Cruz Student PT, I concur with her/his documentation of Neha Parikh.

## 2018-10-15 NOTE — PROGRESS NOTES
Physical Therapy Initial Evaluation and Plan of Care    Patient: Neha Parikh   : 1950  Diagnosis/ICD-10 Code:  Primary localized osteoarthrosis of lower leg, unspecified laterality [M17.10]  Referring practitioner: Teresa Aldrich MD  Date of Initial Visit: 10/15/2018  Today's Date: 10/15/2018  Patient seen for 1 sessions           Subjective Questionnaire: LEFS: 52/80      Subjective Evaluation    History of Present Illness  Date of onset: 10/4/2018  Mechanism of injury: Pt reports insidious onset of knee pain a few years ago starting in her left knee then eventually into her right knee as she compensated to avoid pain in the left.  Pt had bilateral knee injections 2 weeks ago and reports that the pain has decreased immensely.  Pt is now reporting to physical therapy with the intention of having both knees replaced in the future.        Subjective comment: Pt reports with bilateral knee pain  Patient Occupation: none Quality of life: fair    Pain  Current pain ratin  At worst pain ratin  Location: Global knee pain/slighly posterior   Quality: dull ache and grinding  Aggravating factors: stairs and repetitive movement  Progression: improved    Social Support  Lives in: multiple-level home  Lives with: spouse    Hand dominance: right    Diagnostic Tests  X-ray: abnormal (Degenerative changes )    Treatments  No previous or current treatments  Current treatment: injection treatment  Patient Goals  Patient goals for therapy: increased strength, improved balance, decreased pain and return to sport/leisure activities             Objective       Active Range of Motion   Left Knee   Flexion: 128 degrees   Extension: 0 degrees     Right Knee   Flexion: 128 degrees   Extension: 0 degrees     Passive Range of Motion     Additional Passive Range of Motion Details  Limited flexibility in ITB, Piriformis, and hip flexor    Patellar Mobility   Left Knee Hypomobile in the left medial, left lateral, left  superior and left inferior patellar tendon(s).     Right Knee Hypomobile in the medial, lateral, superior and inferior patellar tendon(s).     Strength/Myotome Testing     Left Hip   Planes of Motion   Abduction: 4+    Right Hip   Planes of Motion   Abduction: 4+    Left Knee   Flexion: 4+  Extension: 4+  Quadriceps contraction: fair    Right Knee   Flexion: 4+  Extension: 4+    Tests     Left Hip   Positive Ely's and Maciel.   Maikol: Positive.     Right Hip   Positive Ely's and Maciel.   Maikol: Positive.     Left Knee   Negative lateral Toño, medial Toño and posterior sag.     Right Knee   Negative lateral Toño, medial Toño and posterior sag.     Ambulation     Quality of Movement During Gait     Knee    Knee (Left): Positive varus.   Knee (Right): Positive varus.     Additional Quality of Movement During Gait Details  Pt has excessive genu varum bilaterality          Assessment & Plan     Assessment  Impairments: abnormal gait, abnormal muscle tone, abnormal or restricted ROM, activity intolerance, impaired balance, impaired physical strength, lacks appropriate home exercise program and pain with function  Assessment details: Neha Parikh is a pleasant 68 y.o. female that presents with signs and symptoms consistent with the above diagnosis. Pt would benefit from skilled PT services in order to address listed impairments and increase tolerance to normal daily activities including ADLs, IADLs and recreational activities.  Prognosis: fair  Prognosis details: Patient demonstrates good rehab potential as evidenced by high motivation to participate with PT POC and to return to PLOF/ADLs/IADLs with less pain  Functional Limitations: walking, uncomfortable because of pain and unable to perform repetitive tasks  Goals  Plan Goals: 1.  Patient will be independent in performance of HEP for carryover upon discharge from skilled PT services.  2.  Patient will have bilateral knee strength of 5/5.  3.  Patient will  have LE muscle flexibility WNLs  4.  Patient will have improved LEFS score of 60/80 or better.  5.  Patient will report return to performance of ADLs/IADLs/Leisure activities with minimal to no symptom reproduction/pain limitations    Plan  Therapy options: will be seen for skilled physical therapy services  Planned modality interventions: cryotherapy, TENS, ultrasound and thermotherapy (hydrocollator packs)  Planned therapy interventions: balance/weight-bearing training, flexibility, functional ROM exercises, gait training, home exercise program, IADL retraining, joint mobilization, manual therapy, neuromuscular re-education, soft tissue mobilization, spinal/joint mobilization, strengthening, stretching and therapeutic activities  Frequency: 1x week  Duration in visits: 4  Treatment plan discussed with: patient  Plan details: Pt was educated on the importance of their HEP and their current need for continued skilled physical therapy. Patients goals and potential limitations were discussed and pt is in agreement with current plan of care and treatment emphasis.        Manual Therapy:         mins  97998;  Therapeutic Exercise:    30     mins  48010;     Neuromuscular Kaylie:        mins  92286;    Therapeutic Activity:          mins  45957;     Gait Training:           mins  27921;     Ultrasound:          mins  89230;    Electrical Stimulation:         mins  66194 ( );  Dry Needling          mins self-pay    Timed Treatment:   30   mins   Total Treatment:     60   mins    PT SIGNATURE: Kia Eric, PT   DATE TREATMENT INITIATED: 10/15/2018    Initial Certification  Certification Period: 1/13/2019  I certify that the therapy services are furnished while this patient is under my care.  The services outlined above are required by this patient, and will be reviewed every 90 days.     PHYSICIAN: Teresa Aldrich MD      DATE:     Please sign and return via fax to [phone].. Thank you, [facility] Physical  Therapy.

## 2018-10-15 NOTE — PATIENT INSTRUCTIONS
Access Code: [email or web address]   URL: [email or web address]   Date: 10/15/2018   Prepared by: Kia Eric     Exercises   Supine ITB Stretch with Strap - 10 reps - 3 sets - 1x daily - 7x weekly   Supine Piriformis Stretch Pulling Heel to Hip - 10 reps - 3 sets - 30 sec hold - 1x daily - 7x weekly   Standing Hip Extension - 20 reps - 3 sets - 1-2sec hold - 1x daily - 7x weekly   Modified Maikol Stretch - 4 reps - 1 sets - 30 sec. hold - 1x daily - 7x weekly   Hooklying Clamshell with Resistance - 20 reps - 1 sets - 1-2 sec. hold - 1 SIJ - 1x daily - 7x weekly   Seated Long Arc Quad - 10 reps - 3 sets - 1x daily - 7x weekly   Supine Active Straight Leg Raise - 10 reps - 3 sets - 1x daily - 7x weekly   Standing Hip Abduction - 20 reps - 3 sets - 1-2sec hold - 1x daily - 7x weekly

## 2018-11-27 ENCOUNTER — OFFICE VISIT (OUTPATIENT)
Dept: CARDIOLOGY | Facility: CLINIC | Age: 68
End: 2018-11-27

## 2018-11-27 VITALS
OXYGEN SATURATION: 99 % | SYSTOLIC BLOOD PRESSURE: 136 MMHG | DIASTOLIC BLOOD PRESSURE: 86 MMHG | BODY MASS INDEX: 34.87 KG/M2 | HEIGHT: 66 IN | HEART RATE: 98 BPM | WEIGHT: 217 LBS

## 2018-11-27 DIAGNOSIS — I10 ESSENTIAL HYPERTENSION: Primary | ICD-10-CM

## 2018-11-27 PROCEDURE — 99213 OFFICE O/P EST LOW 20 MIN: CPT | Performed by: PHYSICIAN ASSISTANT

## 2018-11-27 PROCEDURE — 93000 ELECTROCARDIOGRAM COMPLETE: CPT | Performed by: PHYSICIAN ASSISTANT

## 2018-11-27 RX ORDER — ESOMEPRAZOLE MAGNESIUM 20 MG/1
20 FOR SUSPENSION ORAL AS NEEDED
COMMUNITY
End: 2018-12-17

## 2018-11-27 NOTE — PROGRESS NOTES
Date of Office Visit: 2018  Encounter Provider: RAIN Trujillo  Place of Service: [facility] CARDIOLOGY  Patient Name: Neah Parikh  :1950    Chief Complaint   Patient presents with   • Hypertension     3 month follow up   :     HPI: Neha Parikh is a 68 y.o. female who presents today for follow-up.  Old records have been obtained and reviewed by me.  She is a patient of Dr. Mora's who we follow for hypertension.  She has been intolerant to several medications, including beta blockers and calcium channel blockers.  In May of last year we discontinued her carvedilol and started her on chlorthalidone 25 mg daily.  I saw her in 2017, and at that point her blood pressure was well controlled.  She called me a few weeks later with complaints of aches, pains, and ATG dry skin.  She felt was the losartan.  I discontinued the losartan and I started her on hydralazine 25 mg 3 times a day.  She then saw Dr. Mora on 2018.  At that visit she was hypotensive.  However, she does have a little bit of white coat hypertension.  He asked her to check her blood pressure at home and call back in 2 weeks.  His recommendation was to increase the hydralazine if her blood pressure was elevated at home.  She did call back and her blood pressures at home were in the 120s to 140s systolic.  Dr. Mora felt that these were acceptable, and kept her on her current medical regimen.  She's here today for 3 month follow-up.   Over the past 3 months that she's been doing well.  About one year ago, she got a  who comes to the house twice a week.  On the other days of the week she will use the treadmill in the basement sporadically.  Overall she does feel better now that she is exercising.  Her blood pressures at home have been okay.  Initially her blood pressure here in the office was elevated, however after a few minutes it came back down to the 130s systolic.  She  denies any chest pain, shortness of breath, palpitations, dizziness, or syncope.  At times she has a little bit of swelling in her legs that is gone by the time she wakes up in the morning.      Past Medical History:   Diagnosis Date   • Anxiety    • Cervical polyp    • Exercise-induced shortness of breath    • GERD (gastroesophageal reflux disease)    • H/O mammogram    • Hypertension    • Nipple retraction    • Palpitations    • Pap smear for cervical cancer screening    • Sinusitis    • Sleep apnea    • Snoring    •  (spontaneous vaginal delivery)    • TIA (transient ischemic attack)     ? MOMENT OF SLURRED SPEECH   • Varicella    • Weight gain        Past Surgical History:   Procedure Laterality Date   • APPENDECTOMY         Social History     Socioeconomic History   • Marital status:      Spouse name: Not on file   • Number of children: Not on file   • Years of education: Not on file   • Highest education level: Not on file   Social Needs   • Financial resource strain: Not on file   • Food insecurity - worry: Not on file   • Food insecurity - inability: Not on file   • Transportation needs - medical: Not on file   • Transportation needs - non-medical: Not on file   Occupational History   • Not on file   Tobacco Use   • Smoking status: Never Smoker   • Smokeless tobacco: Never Used   Substance and Sexual Activity   • Alcohol use: No   • Drug use: No   • Sexual activity: Defer   Other Topics Concern   • Not on file   Social History Narrative   • Not on file       Family History   Problem Relation Age of Onset   • Breast cancer Maternal Aunt    • Coronary artery disease Father    • Diabetes Father    • Heart disease Father    • Diabetes Sister    • Stroke Paternal Grandfather    • Alzheimer's disease Mother    • No Known Problems Maternal Grandmother    • No Known Problems Maternal Grandfather    • No Known Problems Paternal Grandmother    • No Known Problems Brother    • No Known Problems Daughter    •  "No Known Problems Son    • No Known Problems Paternal Aunt    • BRCA 1/2 Neg Hx    • Colon cancer Neg Hx    • Endometrial cancer Neg Hx    • Ovarian cancer Neg Hx        Review of Systems   Constitution: Negative for chills, fever and malaise/fatigue.   Cardiovascular: Positive for leg swelling. Negative for chest pain, dyspnea on exertion, near-syncope, orthopnea, palpitations, paroxysmal nocturnal dyspnea and syncope.   Respiratory: Negative for cough and shortness of breath.    Musculoskeletal: Negative for joint pain, joint swelling and myalgias.   Gastrointestinal: Negative for abdominal pain, diarrhea, melena, nausea and vomiting.   Genitourinary: Negative for frequency and hematuria.   Neurological: Negative for light-headedness, numbness, paresthesias and seizures.   Allergic/Immunologic: Negative.    All other systems reviewed and are negative.      Allergies   Allergen Reactions   • Azithromycin Hives   • Chlorthalidone Unknown (See Comments)     cramps   • Coreg [Carvedilol] Unknown (See Comments)     Fatigue decreased libido   • Losartan Unknown (See Comments)     unknoiwn   • Penicillins Hives     And rash   • Cefaclor Rash and Hives         Current Outpatient Medications:   •  aspirin 81 MG EC tablet, Take 81 mg by mouth Daily., Disp: , Rfl:   •  esomeprazole (NexIUM) 20 MG packet, Take 20 mg by mouth As Needed., Disp: , Rfl:   •  hydrALAZINE (APRESOLINE) 25 MG tablet, Take 1 tablet by mouth 3 (Three) Times a Day., Disp: 90 tablet, Rfl: 11      Objective:     Vitals:    11/27/18 0858 11/27/18 0909 11/27/18 0917   BP: 144/88 152/92 136/86   BP Location: Right arm Left arm Right arm   Pulse: 98     SpO2: 99%     Weight: 98.4 kg (217 lb)     Height: 167.6 cm (66\")       Body mass index is 35.02 kg/m².    PHYSICAL EXAM:    Physical Exam   Constitutional: She is oriented to person, place, and time. She appears well-developed and well-nourished. No distress.   HENT:   Head: Normocephalic and atraumatic. "   Eyes: Pupils are equal, round, and reactive to light.   Neck: No JVD present. No thyromegaly present.   Cardiovascular: Normal rate, regular rhythm, normal heart sounds and intact distal pulses.   No murmur heard.  Pulmonary/Chest: Effort normal and breath sounds normal. No respiratory distress.   Abdominal: Soft. Bowel sounds are normal. She exhibits no distension. There is no splenomegaly or hepatomegaly. There is no tenderness.   Musculoskeletal: Normal range of motion. She exhibits no edema.   Neurological: She is alert and oriented to person, place, and time.   Skin: Skin is warm and dry. She is not diaphoretic. No erythema.   Psychiatric: She has a normal mood and affect. Her behavior is normal. Judgment normal.         ECG 12 Lead  Date/Time: 11/27/2018 9:24 AM  Performed by: Galina Kirkland PA  Authorized by: Galina Kirkland PA   Comparison: compared with previous ECG from 8/27/2018  Similar to previous ECG  Rhythm: sinus rhythm  BPM: 85  Clinical impression: normal ECG  Comments: Indication: Hypertension              Assessment:       Diagnosis Plan   1. Essential hypertension  ECG 12 Lead     Orders Placed This Encounter   Procedures   • ECG 12 Lead     This order was created via procedure documentation          Plan:       Overall she is doing extremely well.  Her blood pressure is fantastic on low-dose hydralazine.  I'm glad she is exercising.  I'm not going to make any changes to her medical regimen, and she will follow-up with Dr. Mora next year at her regular appointment.    As always, it has been a pleasure to participate in your patient's care.      Sincerely,         Galina Kirkland PA-C

## 2018-12-17 ENCOUNTER — APPOINTMENT (OUTPATIENT)
Dept: WOMENS IMAGING | Facility: HOSPITAL | Age: 68
End: 2018-12-17

## 2018-12-17 ENCOUNTER — OFFICE VISIT (OUTPATIENT)
Dept: OBSTETRICS AND GYNECOLOGY | Age: 68
End: 2018-12-17

## 2018-12-17 ENCOUNTER — PROCEDURE VISIT (OUTPATIENT)
Dept: OBSTETRICS AND GYNECOLOGY | Age: 68
End: 2018-12-17

## 2018-12-17 VITALS
BODY MASS INDEX: 34.39 KG/M2 | SYSTOLIC BLOOD PRESSURE: 160 MMHG | HEIGHT: 66 IN | WEIGHT: 214 LBS | DIASTOLIC BLOOD PRESSURE: 88 MMHG

## 2018-12-17 DIAGNOSIS — L90.0 LICHEN SCLEROSUS: Primary | ICD-10-CM

## 2018-12-17 DIAGNOSIS — Z12.31 VISIT FOR SCREENING MAMMOGRAM: Primary | ICD-10-CM

## 2018-12-17 DIAGNOSIS — M81.0 AGE-RELATED OSTEOPOROSIS WITHOUT CURRENT PATHOLOGICAL FRACTURE: ICD-10-CM

## 2018-12-17 PROCEDURE — 77067 SCR MAMMO BI INCL CAD: CPT | Performed by: OBSTETRICS & GYNECOLOGY

## 2018-12-17 PROCEDURE — 77067 SCR MAMMO BI INCL CAD: CPT | Performed by: RADIOLOGY

## 2018-12-17 PROCEDURE — 99213 OFFICE O/P EST LOW 20 MIN: CPT | Performed by: OBSTETRICS & GYNECOLOGY

## 2018-12-17 NOTE — PROGRESS NOTES
GYN Visit    2018    Patient: Neha Parikh          MR#:[id number]      Chief Complaint   Patient presents with   • Annual Exam     PT HERE FOR ROUTINE GYN F/U AND MG, SHE IS WELL WITH NO COMPLAINTS. LAST PAP  NEG AND NEG HPV. LAST C-SCOPE WAS FEB OF THIS YEAR.       History of Present Illness    68 y.o. female  who presents for  Follow up to osteoporosis and lichen sclerosus  She uses valisone ointment at times and it works, no abnormal lesions  mammo today  UTD pap   CSC done - wants a 3 yr fu  [employer] - 2 kids, (Stephanie is my pt, and son- 7 grandkids and 4 dogs)  Did not take fosomax and instead working with a  on wt bearing exercise  Takes calcium  Declines any medications        No LMP recorded. Patient is postmenopausal.    ________________________________________  Patient Active Problem List   Diagnosis   • Cervical polyp   • Non morbid obesity due to excess calories   • Hypertension       Past Medical History:   Diagnosis Date   • Anxiety    • Cervical polyp    • Exercise-induced shortness of breath    • GERD (gastroesophageal reflux disease)    • H/O mammogram    • Hypertension    • Nipple retraction    • Palpitations    • Pap smear for cervical cancer screening    • Sinusitis    • Sleep apnea    • Snoring    •  (spontaneous vaginal delivery)    • TIA (transient ischemic attack)     ? MOMENT OF SLURRED SPEECH   • Varicella    • Weight gain        Past Surgical History:   Procedure Laterality Date   • APPENDECTOMY         Social History     Tobacco Use   Smoking Status Never Smoker   Smokeless Tobacco Never Used       has a current medication list which includes the following prescription(s): hydralazine.  ________________________________________    Current contraception: post menopausal status      The following portions of the patient's history were reviewed and updated as appropriate: allergies, current medications, past family history, past medical  "history, past social history, past surgical history and problem list.    Review of Systems   Constitutional: Negative for chills, fatigue, fever and unexpected weight change.   Gastrointestinal: Negative for blood in stool, constipation, diarrhea, nausea and vomiting.   Genitourinary: Negative for pelvic pain and vaginal bleeding.   Psychiatric/Behavioral: Negative for dysphoric mood.   All other systems reviewed and are negative.      Pertinent items are noted in HPI.     Objective   Physical Exam    /88   Ht 167.6 cm (66\")   Wt 97.1 kg (214 lb)   BMI 34.54 kg/m²    BP Readings from Last 3 Encounters:   12/17/18 160/88   11/27/18 136/86   08/27/18 170/100      Wt Readings from Last 3 Encounters:   12/17/18 97.1 kg (214 lb)   11/27/18 98.4 kg (217 lb)   10/04/18 97.1 kg (214 lb)      BMI: Estimated body mass index is 34.54 kg/m² as calculated from the following:    Height as of this encounter: 167.6 cm (66\").    Weight as of this encounter: 97.1 kg (214 lb).    Neck: no adenopathy, supple, symmetrical, trachea midline and thyroid not enlarged, symmetric, no tenderness/mass/nodules  Lungs: clear to auscultation bilaterally  Extremities: extremities normal, atraumatic, no cyanosis or edema  Skin: Skin color, texture, turgor normal. No rashes or lesions  Neurologic: Grossly normal  General:   alert, appears stated age and cooperative   Abdomen: soft, non-tender, without masses or organomegaly   Breast: inspection negative, no nipple discharge or bleeding, no masses or nodularity palpable   Vulva: normal   Vagina: normal mucosa   Cervix: no cervical motion tenderness and no lesions   Uterus: normal size, mobile or non-tender   Adnexa: no mass, fullness, tenderness     Assessment:      Neha was seen today for annual exam.    Diagnoses and all orders for this visit:    Lichen sclerosus    Age-related osteoporosis without current pathological fracture              "

## 2020-01-03 PROBLEM — R63.5 GAIN OF WEIGHT: Status: ACTIVE | Noted: 2020-01-03

## 2020-01-03 PROBLEM — R00.2 AWARENESS OF HEARTBEATS: Status: ACTIVE | Noted: 2020-01-03

## 2020-01-03 PROBLEM — M17.9 OSTEOARTHRITIS OF KNEE: Status: ACTIVE | Noted: 2018-12-11

## 2020-01-03 PROBLEM — R06.81 BREATHLESSNESS ON EXERTION: Status: ACTIVE | Noted: 2020-01-03

## 2020-01-03 PROBLEM — G47.30 SLEEP APNEA: Status: ACTIVE | Noted: 2018-04-26

## 2020-01-03 PROBLEM — R06.89 LOUD BREATHING DURING SLEEP: Status: ACTIVE | Noted: 2020-01-03

## 2020-01-03 PROBLEM — N64.53 NIPPLE RETRACTION: Status: ACTIVE | Noted: 2020-01-03

## 2020-01-03 PROBLEM — B02.23 POST-HERPETIC POLYNEUROPATHY: Status: ACTIVE | Noted: 2018-04-26

## 2020-01-03 PROBLEM — M79.10 MUSCLE PAIN: Status: ACTIVE | Noted: 2018-06-22

## 2020-01-06 ENCOUNTER — APPOINTMENT (OUTPATIENT)
Dept: WOMENS IMAGING | Facility: HOSPITAL | Age: 70
End: 2020-01-06

## 2020-01-06 ENCOUNTER — PROCEDURE VISIT (OUTPATIENT)
Dept: OBSTETRICS AND GYNECOLOGY | Age: 70
End: 2020-01-06

## 2020-01-06 ENCOUNTER — OFFICE VISIT (OUTPATIENT)
Dept: OBSTETRICS AND GYNECOLOGY | Age: 70
End: 2020-01-06

## 2020-01-06 VITALS
HEIGHT: 66 IN | SYSTOLIC BLOOD PRESSURE: 158 MMHG | DIASTOLIC BLOOD PRESSURE: 90 MMHG | BODY MASS INDEX: 35.2 KG/M2 | WEIGHT: 219 LBS

## 2020-01-06 DIAGNOSIS — N90.4 LICHEN SCLEROSUS OF FEMALE GENITALIA: ICD-10-CM

## 2020-01-06 DIAGNOSIS — Z00.00 ENCOUNTER FOR MEDICARE ANNUAL WELLNESS EXAM: Primary | ICD-10-CM

## 2020-01-06 DIAGNOSIS — Z12.31 VISIT FOR SCREENING MAMMOGRAM: Primary | ICD-10-CM

## 2020-01-06 PROBLEM — K21.9 GERD (GASTROESOPHAGEAL REFLUX DISEASE): Status: ACTIVE | Noted: 2020-01-06

## 2020-01-06 PROBLEM — Z96.659 KNEE JOINT REPLACED BY OTHER MEANS: Status: ACTIVE | Noted: 2019-07-25

## 2020-01-06 PROCEDURE — G0101 CA SCREEN;PELVIC/BREAST EXAM: HCPCS | Performed by: OBSTETRICS & GYNECOLOGY

## 2020-01-06 PROCEDURE — 77067 SCR MAMMO BI INCL CAD: CPT | Performed by: OBSTETRICS & GYNECOLOGY

## 2020-01-06 PROCEDURE — 77067 SCR MAMMO BI INCL CAD: CPT | Performed by: RADIOLOGY

## 2020-01-06 NOTE — PROGRESS NOTES
Routine Annual Visit    2020    Patient: Neha Parikh          MR#:[id number]      Chief Complaint   Patient presents with   • Gynecologic Exam     AE AND MG TODAY. HX OF LICHEN SCLEROSUS.  2017 PAP = NEG/NEG.  DEXA 2017.  C-SCOPE 2018= polyps, REPEAT in 3 years.  Brother had colon cancer @ 65.  Two children, MIKE and WETZEL.  Neha has had both knees replaced in 2019.        History of Present Illness    69 y.o. female  who presents for annual exam.   mammo today  Lichens sclerosus area bothering her, not very diligent with the valisone ointment  UTD pap  UTD dexa  Doing PT from knee replacement, second one done   CSC done          No LMP recorded. Patient is postmenopausal.  Obstetric History:  OB History        2    Para   2    Term   2       0    AB   0    Living   2       SAB   0    TAB   0    Ectopic   0    Molar        Multiple   0    Live Births   2               Menstrual History:     No LMP recorded. Patient is postmenopausal.       Sexual History:       ________________________________________  Patient Active Problem List   Diagnosis   • Cervical polyp   • Non morbid obesity due to excess calories   • Hypertension   • Accelerated hypertension   • Arthritis of right knee   • Awareness of heartbeats   • Breathlessness on exertion   • Dyslipidemia, goal LDL below 70   • Gain of weight   • Knee pain   • Loud breathing during sleep   • Nipple retraction   • Muscle pain   • Osteoarthritis of knee   • Post-herpetic polyneuropathy   • Sleep apnea   • TIA (transient ischemic attack)   • GERD (gastroesophageal reflux disease)   • Knee joint replaced by other means       Past Medical History:   Diagnosis Date   • Anxiety    • Cervical polyp    • Colon polyp 2018   • Exercise-induced shortness of breath    • GERD (gastroesophageal reflux disease)    • H/O mammogram    • Hypertension    • Nipple retraction    • Palpitations    • Pap smear for cervical cancer screening    • Sinusitis   "  • Sleep apnea    • Snoring    •  (spontaneous vaginal delivery)    • TIA (transient ischemic attack)     ? MOMENT OF SLURRED SPEECH   • Varicella    • Weight gain        Past Surgical History:   Procedure Laterality Date   • APPENDECTOMY     • COLONOSCOPY  2018   • DILATATION AND CURETTAGE N/A 2016    Procedure: CERVICAL POLYPECTOMY;  Surgeon: Mackenzie Lassiter MD;  Location: [facility];  Service:    • REPLACEMENT TOTAL KNEE BILATERAL      Dr. Eric       Social History     Tobacco Use   Smoking Status Never Smoker   Smokeless Tobacco Never Used       has a current medication list which includes the following prescription(s): aspirin, esomeprazole, hydralazine, and betamethasone valerate.  ________________________________________    Current contraception: post menopausal status  History of abnormal Pap smear: no  Family history of Breast cancer: no  Family history of uterine or ovarian cancer: no  Family History of colon cancer/colon polyps: no  History of abnormal mammogram: no      The following portions of the patient's history were reviewed and updated as appropriate: allergies, current medications, past family history, past medical history, past social history, past surgical history and problem list.    Review of Systems    Pertinent items are noted in HPI.     Objective   Physical Exam    /90   Ht 167.6 cm (66\")   Wt 99.3 kg (219 lb)   Breastfeeding No   BMI 35.35 kg/m²    BP Readings from Last 3 Encounters:   20 158/90   18 160/88   18 136/86      Wt Readings from Last 3 Encounters:   20 99.3 kg (219 lb)   18 97.1 kg (214 lb)   18 98.4 kg (217 lb)      BMI: Estimated body mass index is 35.35 kg/m² as calculated from the following:    Height as of this encounter: 167.6 cm (66\").    Weight as of this encounter: 99.3 kg (219 lb).      General:   alert, appears stated age and cooperative   Abdomen: soft, non-tender, without masses or organomegaly "   Breast: inspection negative, no nipple discharge or bleeding, no masses or nodularity palpable   Vulva: normal   Vagina: normal mucosa   Cervix: no cervical motion tenderness and no lesions   Uterus: normal size, mobile or non-tender   Adnexa: no mass, fullness, tenderness     Assessment:    1. Normal annual exam   Assessment     ICD-10-CM ICD-9-CM   1. Encounter for Medicare annual wellness exam Z00.00 V70.0   2. Lichen sclerosus of female genitalia N90.4 701.0     Plan:    Plan     [x]  Mammogram request made  []  PAP done  []  Labs:   []  GC/Chl/TV  []  DEXA scan   []  Referral for colonoscopy:       Neha was seen today for gynecologic exam.    Diagnoses and all orders for this visit:    Encounter for Medicare annual wellness exam    Lichen sclerosus of female genitalia    Other orders  -     betamethasone valerate (VALISONE) 0.1 % ointment; Apply  topically to the appropriate area as directed 2 (Two) Times a Day.    rec yearly mammogram  dexa next 1-2 years , pt reluctant for any meds       Counseling:  --Nutrition: Stressed importance of moderation and caloric balance, stressed fresh fruit and vegetables  --Exercise: Stressed the importance of regular exercise. 3-5 times weekly   - Discussed screening mammogram recommendations.   --Discussed benefits of screening colonoscopy- age 45 unless FH  --Discussed pap smear screening recommendations

## 2021-03-16 ENCOUNTER — TELEPHONE (OUTPATIENT)
Dept: OBSTETRICS AND GYNECOLOGY | Age: 71
End: 2021-03-16

## 2021-03-19 ENCOUNTER — BULK ORDERING (OUTPATIENT)
Dept: CASE MANAGEMENT | Facility: OTHER | Age: 71
End: 2021-03-19

## 2021-03-19 DIAGNOSIS — Z23 IMMUNIZATION DUE: ICD-10-CM

## 2021-07-29 ENCOUNTER — OFFICE (AMBULATORY)
Dept: URBAN - METROPOLITAN AREA CLINIC 75 | Facility: CLINIC | Age: 71
End: 2021-07-29

## 2021-07-29 VITALS — HEIGHT: 66 IN | DIASTOLIC BLOOD PRESSURE: 92 MMHG | SYSTOLIC BLOOD PRESSURE: 168 MMHG | WEIGHT: 234 LBS

## 2021-07-29 DIAGNOSIS — Z80.9 FAMILY HISTORY OF MALIGNANT NEOPLASM, UNSPECIFIED: ICD-10-CM

## 2021-07-29 DIAGNOSIS — K21.9 GASTRO-ESOPHAGEAL REFLUX DISEASE WITHOUT ESOPHAGITIS: ICD-10-CM

## 2021-07-29 DIAGNOSIS — Z86.010 PERSONAL HISTORY OF COLONIC POLYPS: ICD-10-CM

## 2021-07-29 PROCEDURE — 99204 OFFICE O/P NEW MOD 45 MIN: CPT | Performed by: INTERNAL MEDICINE

## 2021-08-31 VITALS
SYSTOLIC BLOOD PRESSURE: 123 MMHG | RESPIRATION RATE: 6 BRPM | DIASTOLIC BLOOD PRESSURE: 55 MMHG | HEART RATE: 75 BPM | HEART RATE: 71 BPM | RESPIRATION RATE: 24 BRPM | DIASTOLIC BLOOD PRESSURE: 74 MMHG | OXYGEN SATURATION: 93 % | HEART RATE: 92 BPM | OXYGEN SATURATION: 92 % | SYSTOLIC BLOOD PRESSURE: 153 MMHG | SYSTOLIC BLOOD PRESSURE: 125 MMHG | OXYGEN SATURATION: 98 % | RESPIRATION RATE: 21 BRPM | OXYGEN SATURATION: 94 % | RESPIRATION RATE: 16 BRPM | DIASTOLIC BLOOD PRESSURE: 84 MMHG | TEMPERATURE: 97.3 F | RESPIRATION RATE: 18 BRPM | OXYGEN SATURATION: 96 % | DIASTOLIC BLOOD PRESSURE: 71 MMHG | SYSTOLIC BLOOD PRESSURE: 198 MMHG | RESPIRATION RATE: 20 BRPM | SYSTOLIC BLOOD PRESSURE: 178 MMHG | OXYGEN SATURATION: 100 % | DIASTOLIC BLOOD PRESSURE: 52 MMHG | SYSTOLIC BLOOD PRESSURE: 149 MMHG | HEART RATE: 79 BPM | DIASTOLIC BLOOD PRESSURE: 59 MMHG | SYSTOLIC BLOOD PRESSURE: 141 MMHG | HEART RATE: 76 BPM | HEART RATE: 77 BPM | TEMPERATURE: 97.7 F | RESPIRATION RATE: 38 BRPM | DIASTOLIC BLOOD PRESSURE: 86 MMHG | SYSTOLIC BLOOD PRESSURE: 140 MMHG | DIASTOLIC BLOOD PRESSURE: 102 MMHG | RESPIRATION RATE: 12 BRPM | SYSTOLIC BLOOD PRESSURE: 134 MMHG | SYSTOLIC BLOOD PRESSURE: 145 MMHG | HEART RATE: 72 BPM | HEART RATE: 67 BPM | DIASTOLIC BLOOD PRESSURE: 124 MMHG | RESPIRATION RATE: 8 BRPM | OXYGEN SATURATION: 95 % | HEART RATE: 83 BPM | OXYGEN SATURATION: 88 % | RESPIRATION RATE: 10 BRPM | DIASTOLIC BLOOD PRESSURE: 57 MMHG | SYSTOLIC BLOOD PRESSURE: 124 MMHG | HEIGHT: 64 IN | WEIGHT: 225 LBS | DIASTOLIC BLOOD PRESSURE: 116 MMHG | OXYGEN SATURATION: 87 % | DIASTOLIC BLOOD PRESSURE: 94 MMHG | SYSTOLIC BLOOD PRESSURE: 74 MMHG | DIASTOLIC BLOOD PRESSURE: 69 MMHG

## 2021-09-02 ENCOUNTER — AMBULATORY SURGICAL CENTER (AMBULATORY)
Dept: URBAN - METROPOLITAN AREA SURGERY 17 | Facility: SURGERY | Age: 71
End: 2021-09-02

## 2021-09-02 ENCOUNTER — OFFICE (AMBULATORY)
Dept: URBAN - METROPOLITAN AREA PATHOLOGY 4 | Facility: PATHOLOGY | Age: 71
End: 2021-09-02

## 2021-09-02 DIAGNOSIS — K44.9 DIAPHRAGMATIC HERNIA WITHOUT OBSTRUCTION OR GANGRENE: ICD-10-CM

## 2021-09-02 DIAGNOSIS — Z80.0 FAMILY HISTORY OF MALIGNANT NEOPLASM OF DIGESTIVE ORGANS: ICD-10-CM

## 2021-09-02 DIAGNOSIS — Z86.010 PERSONAL HISTORY OF COLONIC POLYPS: ICD-10-CM

## 2021-09-02 DIAGNOSIS — K63.5 POLYP OF COLON: ICD-10-CM

## 2021-09-02 DIAGNOSIS — K22.2 ESOPHAGEAL OBSTRUCTION: ICD-10-CM

## 2021-09-02 DIAGNOSIS — K31.89 OTHER DISEASES OF STOMACH AND DUODENUM: ICD-10-CM

## 2021-09-02 DIAGNOSIS — K57.30 DIVERTICULOSIS OF LARGE INTESTINE WITHOUT PERFORATION OR ABS: ICD-10-CM

## 2021-09-02 DIAGNOSIS — K31.7 POLYP OF STOMACH AND DUODENUM: ICD-10-CM

## 2021-09-02 DIAGNOSIS — K64.4 RESIDUAL HEMORRHOIDAL SKIN TAGS: ICD-10-CM

## 2021-09-02 LAB
GI HISTOLOGY: A. SELECT: (no result)
GI HISTOLOGY: B. UNSPECIFIED: (no result)
GI HISTOLOGY: C. SELECT: (no result)
GI HISTOLOGY: PDF REPORT: (no result)

## 2021-09-02 PROCEDURE — 43239 EGD BIOPSY SINGLE/MULTIPLE: CPT | Performed by: INTERNAL MEDICINE

## 2021-09-02 PROCEDURE — 45380 COLONOSCOPY AND BIOPSY: CPT | Mod: PT | Performed by: INTERNAL MEDICINE

## 2021-09-02 PROCEDURE — 88305 TISSUE EXAM BY PATHOLOGIST: CPT | Performed by: INTERNAL MEDICINE

## 2021-09-02 NOTE — SERVICEHPINOTES
RENATO DOUGLAS  is a  71  female   who presents today for a  EGD-Colonoscopy   for   the indications listed below. The updated Patient Profile was reviewed prior to the procedure, in conjunction with the Physical Exam, including medical conditions, surgical procedures, medications, allergies, family history and social history. See Physical Exam time stamp below for date and time of HPI completion.Pre-operatively, I reviewed the indication(s) for the procedure, the risks of the procedure [including but not limited to: unexpected bleeding possibly requiring hospitalization and/or unplanned repeat procedures, perforation possibly requiring surgical treatment, missed lesions and complications of sedation/MAC (also explained by anesthesia staff)]. I have evaluated the patient for risks associated with the planned anesthesia and the procedure to be performed and find the patient an acceptable candidate for IV sedation.Multiple opportunities were provided for any questions or concerns, and all questions were answered satisfactorily before any anesthesia was administered. We will proceed with the planned procedure.BR

## 2021-09-23 ENCOUNTER — PROCEDURE VISIT (OUTPATIENT)
Dept: OBSTETRICS AND GYNECOLOGY | Age: 71
End: 2021-09-23

## 2021-09-23 ENCOUNTER — APPOINTMENT (OUTPATIENT)
Dept: WOMENS IMAGING | Facility: HOSPITAL | Age: 71
End: 2021-09-23

## 2021-09-23 DIAGNOSIS — Z12.31 VISIT FOR SCREENING MAMMOGRAM: Primary | ICD-10-CM

## 2021-09-23 PROCEDURE — 77063 BREAST TOMOSYNTHESIS BI: CPT | Performed by: RADIOLOGY

## 2021-09-23 PROCEDURE — 77067 SCR MAMMO BI INCL CAD: CPT | Performed by: OBSTETRICS & GYNECOLOGY

## 2021-09-23 PROCEDURE — 77067 SCR MAMMO BI INCL CAD: CPT | Performed by: RADIOLOGY

## 2021-09-23 PROCEDURE — 77063 BREAST TOMOSYNTHESIS BI: CPT | Performed by: OBSTETRICS & GYNECOLOGY

## 2021-09-28 ENCOUNTER — OFFICE (AMBULATORY)
Dept: URBAN - METROPOLITAN AREA CLINIC 75 | Facility: CLINIC | Age: 71
End: 2021-09-28

## 2021-09-28 VITALS
HEART RATE: 82 BPM | WEIGHT: 235 LBS | OXYGEN SATURATION: 98 % | SYSTOLIC BLOOD PRESSURE: 150 MMHG | DIASTOLIC BLOOD PRESSURE: 92 MMHG | HEIGHT: 64 IN

## 2021-09-28 DIAGNOSIS — K31.7 POLYP OF STOMACH AND DUODENUM: ICD-10-CM

## 2021-09-28 DIAGNOSIS — K21.9 GASTRO-ESOPHAGEAL REFLUX DISEASE WITHOUT ESOPHAGITIS: ICD-10-CM

## 2021-09-28 DIAGNOSIS — K57.30 DIVERTICULOSIS OF LARGE INTESTINE WITHOUT PERFORATION OR ABS: ICD-10-CM

## 2021-09-28 DIAGNOSIS — K44.9 DIAPHRAGMATIC HERNIA WITHOUT OBSTRUCTION OR GANGRENE: ICD-10-CM

## 2021-09-28 DIAGNOSIS — Z86.010 PERSONAL HISTORY OF COLONIC POLYPS: ICD-10-CM

## 2021-09-28 DIAGNOSIS — K31.89 OTHER DISEASES OF STOMACH AND DUODENUM: ICD-10-CM

## 2021-09-28 PROCEDURE — 99214 OFFICE O/P EST MOD 30 MIN: CPT | Performed by: NURSE PRACTITIONER

## 2021-09-28 RX ORDER — ESOMEPRAZOLE MAGNESIUM 20 MG/1
20 CAPSULE, DELAYED RELEASE ORAL
Qty: 90 | Refills: 3 | Status: ACTIVE
Start: 2021-09-28

## 2021-09-28 RX ORDER — ESOMEPRAZOLE MAGNESIUM 20 MG/1
20 CAPSULE, DELAYED RELEASE ORAL
Refills: 0 | Status: COMPLETED
End: 2021-09-28

## 2021-10-05 ENCOUNTER — TELEPHONE (OUTPATIENT)
Dept: OBSTETRICS AND GYNECOLOGY | Age: 71
End: 2021-10-05

## 2022-01-10 ENCOUNTER — OFFICE VISIT (OUTPATIENT)
Dept: OBSTETRICS AND GYNECOLOGY | Age: 72
End: 2022-01-10

## 2022-01-10 VITALS
DIASTOLIC BLOOD PRESSURE: 84 MMHG | HEIGHT: 66 IN | WEIGHT: 234.6 LBS | SYSTOLIC BLOOD PRESSURE: 132 MMHG | BODY MASS INDEX: 37.7 KG/M2

## 2022-01-10 DIAGNOSIS — Z01.419 WELL WOMAN EXAM WITH ROUTINE GYNECOLOGICAL EXAM: Primary | ICD-10-CM

## 2022-01-10 PROCEDURE — G0101 CA SCREEN;PELVIC/BREAST EXAM: HCPCS | Performed by: OBSTETRICS & GYNECOLOGY

## 2022-01-10 RX ORDER — ESOMEPRAZOLE MAGNESIUM 40 MG/1
CAPSULE, DELAYED RELEASE ORAL
COMMUNITY

## 2022-01-10 RX ORDER — HYDRALAZINE HYDROCHLORIDE 50 MG/1
TABLET, FILM COATED ORAL
COMMUNITY

## 2022-01-10 NOTE — PROGRESS NOTES
Routine Annual Visit    1/10/2022    Patient: Neha Parikh          MR#:[id number]      Chief Complaint   Patient presents with   • Gynecologic Exam     AE today, Last AE 2020, Last pap 2017 neg, Mg 2021, DEXA 2017, Colonoscopy 2021, No problems       History of Present Illness    71 y.o. female  who presents for annual exam.   No issues  mammo and CSC UTD  Worried about wt gain, but knees a problem  Discussed osteoporosis, pt declines any meds or dexa scan  Has 8 grandchildren ( Stephanie has 3, son has 5)          No LMP recorded. Patient is postmenopausal.  Obstetric History:  OB History        2    Para   2    Term   2       0    AB   0    Living   2       SAB   0    IAB   0    Ectopic   0    Molar        Multiple        Live Births   2               Menstrual History:     No LMP recorded. Patient is postmenopausal.       Sexual History:       ________________________________________  Patient Active Problem List   Diagnosis   • Cervical polyp   • Non morbid obesity due to excess calories   • Hypertension   • Accelerated hypertension   • Arthritis of right knee   • Awareness of heartbeats   • Breathlessness on exertion   • Dyslipidemia, goal LDL below 70   • Gain of weight   • Knee pain   • Loud breathing during sleep   • Nipple retraction   • Muscle pain   • Osteoarthritis of knee   • Post-herpetic polyneuropathy   • Sleep apnea   • TIA (transient ischemic attack)   • GERD (gastroesophageal reflux disease)   • Knee joint replaced by other means       Past Medical History:   Diagnosis Date   • Anxiety    • Cervical polyp    • Colon polyp    • Exercise-induced shortness of breath    • GERD (gastroesophageal reflux disease)    • H/O mammogram    • Hypertension    • Nipple retraction    • Palpitations    • Pap smear for cervical cancer screening    • Sinusitis    • Sleep apnea    • Snoring    •  (spontaneous vaginal delivery)    • TIA (transient ischemic attack)  "    ? MOMENT OF SLURRED SPEECH   • Varicella    • Weight gain        Past Surgical History:   Procedure Laterality Date   • APPENDECTOMY     • COLONOSCOPY  02/2018   • DILATATION AND CURETTAGE N/A 11/29/2016    Procedure: CERVICAL POLYPECTOMY;  Surgeon: Mackenzie Lassiter MD;  Location: [facility];  Service:    • REPLACEMENT TOTAL KNEE BILATERAL  2019    Dr. Eric       Social History     Tobacco Use   Smoking Status Never Smoker   Smokeless Tobacco Never Used       has a current medication list which includes the following prescription(s): aspirin, esomeprazole, hydralazine, and metoprolol tartrate.  ________________________________________    Current contraception: post menopausal status  History of abnormal Pap smear: no  Family history of Breast cancer: no  Family history of uterine or ovarian cancer: no  Family History of colon cancer/colon polyps: no  History of abnormal mammogram: no      The following portions of the patient's history were reviewed and updated as appropriate: allergies, current medications, past family history, past medical history, past social history, past surgical history and problem list.    Review of Systems    Pertinent items are noted in HPI.     Objective   Physical Exam    /84   Ht 167.6 cm (66\")   Wt 106 kg (234 lb 9.6 oz)   Breastfeeding No   BMI 37.87 kg/m²    BP Readings from Last 3 Encounters:   01/10/22 132/84   01/06/20 158/90   12/17/18 160/88      Wt Readings from Last 3 Encounters:   01/10/22 106 kg (234 lb 9.6 oz)   01/06/20 99.3 kg (219 lb)   12/17/18 97.1 kg (214 lb)      BMI: Estimated body mass index is 37.87 kg/m² as calculated from the following:    Height as of this encounter: 167.6 cm (66\").    Weight as of this encounter: 106 kg (234 lb 9.6 oz).      General:   alert, appears stated age and cooperative   Abdomen: soft, non-tender, without masses or organomegaly   Breast: inspection negative, no nipple discharge or bleeding, no masses or nodularity " palpable   Vulva: normal, Bartholin's, Urethra, Ali Molina's normal   Vagina: normal mucosa   Cervix: no cervical motion tenderness and no lesions   Uterus: normal size, mobile and non-tender   Adnexa: no mass, fullness, tenderness and rectal exam normal     Assessment:    1. Normal annual exam   Assessment     ICD-10-CM ICD-9-CM   1. Well woman exam with routine gynecological exam  Z01.419 V72.31     Plan:    Plan     []  Mammogram request made  []  PAP done  []  Labs:   []  GC/Chl/TV  []  DEXA scan   []  Referral for colonoscopy:       Diagnoses and all orders for this visit:    1. Well woman exam with routine gynecological exam (Primary)      ostoporosis in past, pt declines dexa and doesn't want any meds       Counseling:  --Nutrition: Stressed importance of moderation and caloric balance, stressed fresh fruit and vegetables  --Exercise: Stressed the importance of regular exercise. 3-5 times weekly   - Discussed screening mammogram recommendations.   --Discussed benefits of screening colonoscopy- age 45 unless FH  --Discussed pap smear screening recommendations

## 2022-01-11 ENCOUNTER — OFFICE (AMBULATORY)
Dept: URBAN - METROPOLITAN AREA CLINIC 75 | Facility: CLINIC | Age: 72
End: 2022-01-11

## 2022-01-11 VITALS
SYSTOLIC BLOOD PRESSURE: 142 MMHG | WEIGHT: 235.6 LBS | OXYGEN SATURATION: 93 % | DIASTOLIC BLOOD PRESSURE: 88 MMHG | HEART RATE: 87 BPM | HEIGHT: 64 IN

## 2022-01-11 DIAGNOSIS — K21.9 GASTRO-ESOPHAGEAL REFLUX DISEASE WITHOUT ESOPHAGITIS: ICD-10-CM

## 2022-01-11 PROBLEM — D12.0 BENIGN NEOPLASM OF CECUM: Status: INACTIVE | Noted: 2018-05-17

## 2022-01-11 PROBLEM — K22.2 ESOPHAGEAL OBSTRUCTION: Status: INACTIVE | Noted: 2021-09-02

## 2022-01-11 PROBLEM — K31.89 OTHER DISEASES OF STOMACH AND DUODENUM: Status: INACTIVE | Noted: 2021-09-02

## 2022-01-11 PROBLEM — K31.7 POLYP OF STOMACH AND DUODENUM: Status: ACTIVE | Noted: 2021-09-02

## 2022-01-11 PROBLEM — K63.5 POLYP OF COLON: Status: INACTIVE | Noted: 2021-09-02

## 2022-01-11 PROCEDURE — 99213 OFFICE O/P EST LOW 20 MIN: CPT | Performed by: INTERNAL MEDICINE

## 2022-01-11 RX ORDER — ESOMEPRAZOLE MAGNESIUM 20 MG/1
20 CAPSULE, DELAYED RELEASE ORAL
Qty: 90 | Refills: 3 | Status: ACTIVE
Start: 2021-09-28

## 2023-01-19 ENCOUNTER — PROCEDURE VISIT (OUTPATIENT)
Dept: OBSTETRICS AND GYNECOLOGY | Age: 73
End: 2023-01-19
Payer: MEDICARE

## 2023-01-19 DIAGNOSIS — Z12.31 VISIT FOR SCREENING MAMMOGRAM: Primary | ICD-10-CM

## 2023-01-19 PROCEDURE — 77067 SCR MAMMO BI INCL CAD: CPT | Performed by: OBSTETRICS & GYNECOLOGY

## 2023-01-19 PROCEDURE — 77063 BREAST TOMOSYNTHESIS BI: CPT | Performed by: OBSTETRICS & GYNECOLOGY

## 2023-08-08 ENCOUNTER — OFFICE (AMBULATORY)
Dept: URBAN - METROPOLITAN AREA CLINIC 76 | Facility: CLINIC | Age: 73
End: 2023-08-08
Payer: COMMERCIAL

## 2023-08-08 VITALS
DIASTOLIC BLOOD PRESSURE: 77 MMHG | HEIGHT: 64 IN | SYSTOLIC BLOOD PRESSURE: 132 MMHG | HEART RATE: 70 BPM | OXYGEN SATURATION: 93 % | WEIGHT: 234 LBS

## 2023-08-08 DIAGNOSIS — Z80.9 FAMILY HISTORY OF MALIGNANT NEOPLASM, UNSPECIFIED: ICD-10-CM

## 2023-08-08 DIAGNOSIS — K31.89 OTHER DISEASES OF STOMACH AND DUODENUM: ICD-10-CM

## 2023-08-08 DIAGNOSIS — Z86.010 PERSONAL HISTORY OF COLONIC POLYPS: ICD-10-CM

## 2023-08-08 DIAGNOSIS — K44.9 DIAPHRAGMATIC HERNIA WITHOUT OBSTRUCTION OR GANGRENE: ICD-10-CM

## 2023-08-08 DIAGNOSIS — D13.4 BENIGN NEOPLASM OF LIVER: ICD-10-CM

## 2023-08-08 DIAGNOSIS — K57.30 DIVERTICULOSIS OF LARGE INTESTINE WITHOUT PERFORATION OR ABS: ICD-10-CM

## 2023-08-08 DIAGNOSIS — K86.2 CYST OF PANCREAS: ICD-10-CM

## 2023-08-08 DIAGNOSIS — C64.9 MALIGNANT NEOPLASM OF UNSPECIFIED KIDNEY, EXCEPT RENAL PELVI: ICD-10-CM

## 2023-08-08 DIAGNOSIS — K21.9 GASTRO-ESOPHAGEAL REFLUX DISEASE WITHOUT ESOPHAGITIS: ICD-10-CM

## 2023-08-08 DIAGNOSIS — R93.3 ABNORMAL FINDINGS ON DIAGNOSTIC IMAGING OF OTHER PARTS OF DI: ICD-10-CM

## 2023-08-08 PROCEDURE — 99214 OFFICE O/P EST MOD 30 MIN: CPT | Performed by: INTERNAL MEDICINE

## 2024-01-25 ENCOUNTER — HOSPITAL ENCOUNTER (OUTPATIENT)
Facility: HOSPITAL | Age: 74
Discharge: HOME | End: 2024-01-25
Admitting: OBSTETRICS & GYNECOLOGY
Payer: MEDICARE

## 2024-01-25 ENCOUNTER — OFFICE VISIT (OUTPATIENT)
Dept: OBSTETRICS AND GYNECOLOGY | Age: 74
End: 2024-01-25
Payer: MEDICARE

## 2024-01-25 VITALS
DIASTOLIC BLOOD PRESSURE: 74 MMHG | BODY MASS INDEX: 37.77 KG/M2 | SYSTOLIC BLOOD PRESSURE: 118 MMHG | HEIGHT: 66 IN | WEIGHT: 235 LBS

## 2024-01-25 DIAGNOSIS — Z01.419 ENCOUNTER FOR GYNECOLOGICAL EXAMINATION WITHOUT ABNORMAL FINDING: Primary | ICD-10-CM

## 2024-01-25 DIAGNOSIS — Z12.31 VISIT FOR SCREENING MAMMOGRAM: ICD-10-CM

## 2024-01-25 DIAGNOSIS — Z12.31 SCREENING MAMMOGRAM FOR BREAST CANCER: ICD-10-CM

## 2024-01-25 PROCEDURE — 77063 BREAST TOMOSYNTHESIS BI: CPT

## 2024-01-25 PROCEDURE — 77067 SCR MAMMO BI INCL CAD: CPT

## 2024-01-25 RX ORDER — CARVEDILOL 25 MG/1
TABLET ORAL
COMMUNITY
Start: 2023-12-06

## 2024-01-25 RX ORDER — AMLODIPINE BESYLATE 5 MG/1
5 TABLET ORAL
COMMUNITY
Start: 2023-12-06

## 2024-01-25 RX ORDER — LOSARTAN POTASSIUM 50 MG/1
50 TABLET ORAL
COMMUNITY
Start: 2023-09-05

## 2024-01-25 RX ORDER — ACETAMINOPHEN 500 MG
500 TABLET ORAL
COMMUNITY

## 2024-01-25 NOTE — PROGRESS NOTES
Routine Annual Visit    2024    Patient: Neha Parikh          MR#:[id number]      Chief Complaint   Patient presents with    Gynecologic Exam     Annual Exam - last pap 17 neg, mammo today, dexa 17, per pt last colonoscopy a couple years ago, Pt has no complaints today       History of Present Illness    73 y.o. female  who presents for annual exam.     Patient is feeling well today  She had her mammogram today  No vaginal bleeding or GYN complaints  She recently had kidney cancer and a kidney removed in the past couple years  Her remaining kidney is working well  The patient has no complaints today  No urinary incontinence      No LMP recorded. Patient is postmenopausal.  Obstetric History:  OB History          2    Para   2    Term   2       0    AB   0    Living   2         SAB   0    IAB   0    Ectopic   0    Molar        Multiple        Live Births   2               Menstrual History:     No LMP recorded. Patient is postmenopausal.       Sexual History:       ________________________________________  Patient Active Problem List   Diagnosis    Cervical polyp    Non morbid obesity due to excess calories    Hypertension    Accelerated hypertension    Arthritis of right knee    Awareness of heartbeats    Breathlessness on exertion    Dyslipidemia, goal LDL below 70    Gain of weight    Knee pain    Loud breathing during sleep    Nipple retraction    Muscle pain    Osteoarthritis of knee    Post-herpetic polyneuropathy    Sleep apnea    TIA (transient ischemic attack)    GERD (gastroesophageal reflux disease)    Knee joint replaced by other means       Past Medical History:   Diagnosis Date    Anxiety     Cervical polyp     Colon polyp 2018    Exercise-induced shortness of breath     GERD (gastroesophageal reflux disease)     H/O mammogram     Hypertension     Nipple retraction     Palpitations     Pap smear for cervical cancer screening     Sinusitis     Sleep apnea      "Snoring      (spontaneous vaginal delivery)     TIA (transient ischemic attack)     ? MOMENT OF SLURRED SPEECH    Varicella     Weight gain        Past Surgical History:   Procedure Laterality Date    APPENDECTOMY      COLONOSCOPY  2018    DILATATION AND CURETTAGE N/A 2016    Procedure: CERVICAL POLYPECTOMY;  Surgeon: Mackenzie Lassiter MD;  Location: [facility];  Service:     NEPHRECTOMY  2022    REPLACEMENT TOTAL KNEE BILATERAL      Dr. Eric       Social History     Tobacco Use   Smoking Status Never   Smokeless Tobacco Never       has a current medication list which includes the following prescription(s): acetaminophen, amlodipine, aspirin, carvedilol, cholecalciferol, and losartan.  ________________________________________    Current contraception: post menopausal status  History of abnormal Pap smear: no  Family history of Breast cancer: mat aunt  Family history of uterine or ovarian cancer: no  Family History of colon cancer/colon polyps: no  History of abnormal mammogram: no      The following portions of the patient's history were reviewed and updated as appropriate: allergies, current medications, past family history, past medical history, past social history, past surgical history, and problem list.    Review of Systems    Pertinent items are noted in HPI.     Objective   Physical Exam    /74   Ht 167.6 cm (66\")   Wt 107 kg (235 lb)   BMI 37.93 kg/m²    BP Readings from Last 3 Encounters:   24 118/74   01/10/22 132/84   20 158/90      Wt Readings from Last 3 Encounters:   24 107 kg (235 lb)   01/10/22 106 kg (234 lb 9.6 oz)   20 99.3 kg (219 lb)      BMI: Estimated body mass index is 37.93 kg/m² as calculated from the following:    Height as of this encounter: 167.6 cm (66\").    Weight as of this encounter: 107 kg (235 lb).      General:   alert, appears stated age, and cooperative   Abdomen: soft, non-tender, without masses or organomegaly "   Breast: inspection negative, no nipple discharge or bleeding, no masses or nodularity palpable   Vulva: normal   Vagina: normal mucosa   Cervix: no cervical motion tenderness and no lesions   Uterus: normal size, mobile, and non-tender   Adnexa: no mass, fullness, tenderness     Assessment:    1. Normal annual exam   Assessment     ICD-10-CM ICD-9-CM   1. Encounter for gynecological examination without abnormal finding  Z01.419 V72.31   2. Screening mammogram for breast cancer  Z12.31 V76.12     Plan:    Plan     [x]  Mammogram request made  []  PAP done  []  Labs:   []  GC/Chl/TV  []  DEXA scan   []  Referral for colonoscopy:       Diagnoses and all orders for this visit:    1. Encounter for gynecological examination without abnormal finding (Primary)    2. Screening mammogram for breast cancer  -     Mammo Screening Digital Tomosynthesis Bilateral With CAD; Future            Counseling:  --Nutrition: Stressed importance of moderation and caloric balance, stressed fresh fruit and vegetables  --Exercise: Stressed the importance of regular exercise. 3-5 times weekly   - Discussed screening mammogram recommendations.   --Discussed benefits of screening colonoscopy- age 45 unless FH  --Discussed pap smear screening recommendations

## 2024-10-31 VITALS
HEART RATE: 63 BPM | DIASTOLIC BLOOD PRESSURE: 84 MMHG | RESPIRATION RATE: 15 BRPM | SYSTOLIC BLOOD PRESSURE: 94 MMHG | RESPIRATION RATE: 37 BRPM | SYSTOLIC BLOOD PRESSURE: 145 MMHG | HEART RATE: 71 BPM | OXYGEN SATURATION: 97 % | DIASTOLIC BLOOD PRESSURE: 42 MMHG | SYSTOLIC BLOOD PRESSURE: 91 MMHG | DIASTOLIC BLOOD PRESSURE: 44 MMHG | DIASTOLIC BLOOD PRESSURE: 57 MMHG | DIASTOLIC BLOOD PRESSURE: 49 MMHG | DIASTOLIC BLOOD PRESSURE: 46 MMHG | SYSTOLIC BLOOD PRESSURE: 86 MMHG | DIASTOLIC BLOOD PRESSURE: 56 MMHG | HEART RATE: 58 BPM | DIASTOLIC BLOOD PRESSURE: 48 MMHG | SYSTOLIC BLOOD PRESSURE: 105 MMHG | TEMPERATURE: 96.9 F | DIASTOLIC BLOOD PRESSURE: 51 MMHG | HEART RATE: 65 BPM | OXYGEN SATURATION: 95 % | RESPIRATION RATE: 14 BRPM | RESPIRATION RATE: 26 BRPM | HEIGHT: 64 IN | SYSTOLIC BLOOD PRESSURE: 96 MMHG | WEIGHT: 215 LBS | SYSTOLIC BLOOD PRESSURE: 89 MMHG | DIASTOLIC BLOOD PRESSURE: 68 MMHG | RESPIRATION RATE: 20 BRPM | HEART RATE: 70 BPM | RESPIRATION RATE: 29 BRPM | OXYGEN SATURATION: 98 % | RESPIRATION RATE: 22 BRPM | RESPIRATION RATE: 27 BRPM | HEART RATE: 73 BPM | RESPIRATION RATE: 31 BRPM | OXYGEN SATURATION: 94 % | HEART RATE: 59 BPM | OXYGEN SATURATION: 100 % | SYSTOLIC BLOOD PRESSURE: 117 MMHG | RESPIRATION RATE: 12 BRPM | OXYGEN SATURATION: 89 % | SYSTOLIC BLOOD PRESSURE: 101 MMHG | DIASTOLIC BLOOD PRESSURE: 55 MMHG | DIASTOLIC BLOOD PRESSURE: 81 MMHG | SYSTOLIC BLOOD PRESSURE: 88 MMHG | SYSTOLIC BLOOD PRESSURE: 147 MMHG | RESPIRATION RATE: 16 BRPM | HEART RATE: 67 BPM | SYSTOLIC BLOOD PRESSURE: 124 MMHG | SYSTOLIC BLOOD PRESSURE: 157 MMHG | OXYGEN SATURATION: 96 % | SYSTOLIC BLOOD PRESSURE: 87 MMHG | DIASTOLIC BLOOD PRESSURE: 47 MMHG | HEART RATE: 61 BPM | HEART RATE: 75 BPM | TEMPERATURE: 97.1 F | SYSTOLIC BLOOD PRESSURE: 98 MMHG

## 2024-11-04 ENCOUNTER — AMBULATORY SURGICAL CENTER (AMBULATORY)
Age: 74
End: 2024-11-04

## 2024-11-04 ENCOUNTER — OFFICE (AMBULATORY)
Age: 74
End: 2024-11-04

## 2024-11-04 ENCOUNTER — OFFICE (AMBULATORY)
Dept: URBAN - METROPOLITAN AREA PATHOLOGY 4 | Facility: PATHOLOGY | Age: 74
End: 2024-11-04
Payer: MEDICARE

## 2024-11-04 DIAGNOSIS — K57.30 DIVERTICULOSIS OF LARGE INTESTINE WITHOUT PERFORATION OR ABS: ICD-10-CM

## 2024-11-04 DIAGNOSIS — K21.00 GASTRO-ESOPHAGEAL REFLUX DISEASE WITH ESOPHAGITIS, WITHOUT B: ICD-10-CM

## 2024-11-04 DIAGNOSIS — K31.89 OTHER DISEASES OF STOMACH AND DUODENUM: ICD-10-CM

## 2024-11-04 DIAGNOSIS — K62.1 RECTAL POLYP: ICD-10-CM

## 2024-11-04 DIAGNOSIS — Z86.0102 PERSONAL HISTORY OF HYPERPLASTIC COLON POLYPS: ICD-10-CM

## 2024-11-04 DIAGNOSIS — Q39.8 OTHER CONGENITAL MALFORMATIONS OF ESOPHAGUS: ICD-10-CM

## 2024-11-04 DIAGNOSIS — K44.9 DIAPHRAGMATIC HERNIA WITHOUT OBSTRUCTION OR GANGRENE: ICD-10-CM

## 2024-11-04 DIAGNOSIS — Z09 ENCOUNTER FOR FOLLOW-UP EXAMINATION AFTER COMPLETED TREATMEN: ICD-10-CM

## 2024-11-04 DIAGNOSIS — Z80.9 FAMILY HISTORY OF MALIGNANT NEOPLASM, UNSPECIFIED: ICD-10-CM

## 2024-11-04 DIAGNOSIS — D12.2 BENIGN NEOPLASM OF ASCENDING COLON: ICD-10-CM

## 2024-11-04 DIAGNOSIS — K31.7 POLYP OF STOMACH AND DUODENUM: ICD-10-CM

## 2024-11-04 DIAGNOSIS — K29.70 GASTRITIS, UNSPECIFIED, WITHOUT BLEEDING: ICD-10-CM

## 2024-11-04 DIAGNOSIS — K63.5 POLYP OF COLON: ICD-10-CM

## 2024-11-04 PROBLEM — K22.89 OTHER SPECIFIED DISEASE OF ESOPHAGUS: Status: ACTIVE | Noted: 2024-11-04

## 2024-11-04 LAB
GI HISTOLOGY: A. STOMACH ANTRUM: (no result)
GI HISTOLOGY: B. STOMACH BODY: (no result)
GI HISTOLOGY: C. GASTRO-ESOPHAGEAL JUNCTION: (no result)
GI HISTOLOGY: D. DISTAL ASCENDING COLON: (no result)
GI HISTOLOGY: PDF REPORT: (no result)
Lab: (no result)
Lab: (no result)

## 2024-11-04 PROCEDURE — 88305 TISSUE EXAM BY PATHOLOGIST: CPT | Performed by: PATHOLOGY

## 2024-11-04 PROCEDURE — 45381 COLONOSCOPY SUBMUCOUS NJX: CPT | Mod: PT | Performed by: INTERNAL MEDICINE

## 2024-11-04 PROCEDURE — 88342 IMHCHEM/IMCYTCHM 1ST ANTB: CPT | Performed by: PATHOLOGY

## 2024-11-04 PROCEDURE — 45385 COLONOSCOPY W/LESION REMOVAL: CPT | Mod: PT | Performed by: INTERNAL MEDICINE

## 2024-11-04 PROCEDURE — 43239 EGD BIOPSY SINGLE/MULTIPLE: CPT | Performed by: INTERNAL MEDICINE

## 2024-11-04 RX ORDER — ESOMEPRAZOLE MAGNESIUM 40 MG/1
40 CAPSULE, DELAYED RELEASE ORAL
Qty: 30 | Refills: 5 | Status: ACTIVE
Start: 2024-11-04

## 2025-01-22 ENCOUNTER — TELEPHONE (OUTPATIENT)
Dept: OBSTETRICS AND GYNECOLOGY | Age: 75
End: 2025-01-22

## 2025-01-22 NOTE — TELEPHONE ENCOUNTER
DELETE AFTER REVIEWING: Send this encounter to the appropriate pool. See your Call Action Grid or Workflows for direction.    Caller: Neha Parikh    Relationship to patient: Self    Best call back number: [phone]    Chief complaint: NEEDS TO RESCHEDULE MAMMO    Type of visit: NO IMPLANTS     Requested date: END O FEBRUARY BUT THE EARLIEST TIME      If rescheduling, when is the original appointment: 1/27/25     Additional notes:PLEASE FOLLOW UP

## 2025-02-12 ENCOUNTER — TELEPHONE (OUTPATIENT)
Dept: OBSTETRICS AND GYNECOLOGY | Age: 75
End: 2025-02-12

## 2025-04-04 ENCOUNTER — TELEPHONE (OUTPATIENT)
Dept: OBSTETRICS AND GYNECOLOGY | Age: 75
End: 2025-04-04

## 2025-04-04 NOTE — TELEPHONE ENCOUNTER
Caller: Neha S Kati  Female, 74 y.o., 1950   MRN: [id number]  CSN: [id number]  Phone: [phone]    Relationship to patient: SELF        Type of visit: PT CALLED TO [facility] MAMMOGRAM. PT REQ A CALL BACK

## 2025-04-29 ENCOUNTER — HOSPITAL ENCOUNTER (OUTPATIENT)
Facility: HOSPITAL | Age: 75
Discharge: HOME | End: 2025-04-29
Admitting: OBSTETRICS & GYNECOLOGY
Payer: MEDICARE

## 2025-04-29 DIAGNOSIS — Z12.31 SCREENING MAMMOGRAM FOR BREAST CANCER: ICD-10-CM

## 2025-04-29 PROCEDURE — 77067 SCR MAMMO BI INCL CAD: CPT

## 2025-04-29 PROCEDURE — 77063 BREAST TOMOSYNTHESIS BI: CPT

## 2025-07-31 ENCOUNTER — OFFICE (AMBULATORY)
Dept: URBAN - METROPOLITAN AREA CLINIC 76 | Facility: CLINIC | Age: 75
End: 2025-07-31
Payer: MEDICARE

## 2025-07-31 VITALS
HEIGHT: 64 IN | WEIGHT: 246 LBS | SYSTOLIC BLOOD PRESSURE: 118 MMHG | OXYGEN SATURATION: 99 % | HEART RATE: 71 BPM | DIASTOLIC BLOOD PRESSURE: 67 MMHG

## 2025-07-31 DIAGNOSIS — D13.4 BENIGN NEOPLASM OF LIVER: ICD-10-CM

## 2025-07-31 DIAGNOSIS — Q39.8 OTHER CONGENITAL MALFORMATIONS OF ESOPHAGUS: ICD-10-CM

## 2025-07-31 DIAGNOSIS — K86.2 CYST OF PANCREAS: ICD-10-CM

## 2025-07-31 DIAGNOSIS — Z80.9 FAMILY HISTORY OF MALIGNANT NEOPLASM, UNSPECIFIED: ICD-10-CM

## 2025-07-31 DIAGNOSIS — K57.30 DIVERTICULOSIS OF LARGE INTESTINE WITHOUT PERFORATION OR ABS: ICD-10-CM

## 2025-07-31 DIAGNOSIS — K21.9 GASTRO-ESOPHAGEAL REFLUX DISEASE WITHOUT ESOPHAGITIS: ICD-10-CM

## 2025-07-31 DIAGNOSIS — K29.70 GASTRITIS, UNSPECIFIED, WITHOUT BLEEDING: ICD-10-CM

## 2025-07-31 DIAGNOSIS — R93.3 ABNORMAL FINDINGS ON DIAGNOSTIC IMAGING OF OTHER PARTS OF DI: ICD-10-CM

## 2025-07-31 DIAGNOSIS — K31.7 POLYP OF STOMACH AND DUODENUM: ICD-10-CM

## 2025-07-31 DIAGNOSIS — Z86.0101 PERSONAL HISTORY OF ADENOMATOUS AND SERRATED COLON POLYPS: ICD-10-CM

## 2025-07-31 DIAGNOSIS — K44.9 DIAPHRAGMATIC HERNIA WITHOUT OBSTRUCTION OR GANGRENE: ICD-10-CM

## 2025-07-31 PROBLEM — D12.2 BENIGN NEOPLASM OF ASCENDING COLON: Status: INACTIVE | Noted: 2024-11-04

## 2025-07-31 PROBLEM — K62.1 RECTAL POLYP: Status: INACTIVE | Noted: 2024-11-04

## 2025-07-31 PROBLEM — Z86.010 PERSONAL HISTORY OF COLONIC POLYPS: Status: ACTIVE | Noted: 2018-05-17

## 2025-07-31 PROBLEM — K22.89 OTHER SPECIFIED DISEASE OF ESOPHAGUS: Status: INACTIVE | Noted: 2024-11-04

## 2025-07-31 PROCEDURE — 99214 OFFICE O/P EST MOD 30 MIN: CPT | Performed by: INTERNAL MEDICINE
